# Patient Record
Sex: FEMALE | Race: WHITE | NOT HISPANIC OR LATINO | URBAN - METROPOLITAN AREA
[De-identification: names, ages, dates, MRNs, and addresses within clinical notes are randomized per-mention and may not be internally consistent; named-entity substitution may affect disease eponyms.]

---

## 2017-04-25 ENCOUNTER — APPOINTMENT (OUTPATIENT)
Dept: WOMENS IMAGING | Facility: HOSPITAL | Age: 52
End: 2017-04-25

## 2017-04-25 PROCEDURE — 77063 BREAST TOMOSYNTHESIS BI: CPT | Performed by: RADIOLOGY

## 2017-04-25 PROCEDURE — 77067 SCR MAMMO BI INCL CAD: CPT | Performed by: RADIOLOGY

## 2017-05-23 ENCOUNTER — APPOINTMENT (OUTPATIENT)
Dept: GENERAL RADIOLOGY | Facility: HOSPITAL | Age: 52
End: 2017-05-23

## 2017-05-23 ENCOUNTER — HOSPITAL ENCOUNTER (EMERGENCY)
Facility: HOSPITAL | Age: 52
Discharge: HOME | End: 2017-05-23
Attending: EMERGENCY MEDICINE | Admitting: EMERGENCY MEDICINE

## 2017-05-23 VITALS
OXYGEN SATURATION: 98 % | HEART RATE: 68 BPM | HEIGHT: 63 IN | BODY MASS INDEX: 26.58 KG/M2 | TEMPERATURE: 98.1 F | WEIGHT: 150 LBS | RESPIRATION RATE: 18 BRPM | SYSTOLIC BLOOD PRESSURE: 144 MMHG | DIASTOLIC BLOOD PRESSURE: 85 MMHG

## 2017-05-23 DIAGNOSIS — K59.00 CONSTIPATION, UNSPECIFIED CONSTIPATION TYPE: Primary | ICD-10-CM

## 2017-05-23 DIAGNOSIS — M25.512 CHRONIC PAIN OF BOTH SHOULDERS: ICD-10-CM

## 2017-05-23 DIAGNOSIS — G89.29 CHRONIC PAIN OF BOTH SHOULDERS: ICD-10-CM

## 2017-05-23 DIAGNOSIS — M25.511 CHRONIC PAIN OF BOTH SHOULDERS: ICD-10-CM

## 2017-05-23 LAB
ALBUMIN SERPL-MCNC: 4.1 G/DL (ref 3.5–5)
ALBUMIN/GLOB SERPL: 1.2 G/DL (ref 1.5–2.5)
ALP SERPL-CCNC: 70 U/L (ref 35–104)
ALT SERPL W P-5'-P-CCNC: 16 U/L (ref 10–36)
ANION GAP SERPL CALCULATED.3IONS-SCNC: 3.1 MMOL/L (ref 3.6–11.2)
AST SERPL-CCNC: 22 U/L (ref 10–30)
BACTERIA UR QL AUTO: ABNORMAL /HPF
BASOPHILS # BLD AUTO: 0.05 10*3/MM3 (ref 0–0.3)
BASOPHILS NFR BLD AUTO: 0.9 % (ref 0–2)
BILIRUB SERPL-MCNC: 0.4 MG/DL (ref 0.2–1.8)
BILIRUB UR QL STRIP: NEGATIVE
BUN BLD-MCNC: 10 MG/DL (ref 7–21)
BUN/CREAT SERPL: 14.1 (ref 7–25)
CALCIUM SPEC-SCNC: 9.5 MG/DL (ref 7.7–10)
CHLORIDE SERPL-SCNC: 109 MMOL/L (ref 99–112)
CLARITY UR: CLEAR
CO2 SERPL-SCNC: 27.9 MMOL/L (ref 24.3–31.9)
COLOR UR: YELLOW
CREAT BLD-MCNC: 0.71 MG/DL (ref 0.43–1.29)
DEPRECATED RDW RBC AUTO: 40.7 FL (ref 37–54)
EOSINOPHIL # BLD AUTO: 0.11 10*3/MM3 (ref 0–0.7)
EOSINOPHIL NFR BLD AUTO: 1.9 % (ref 0–5)
ERYTHROCYTE [DISTWIDTH] IN BLOOD BY AUTOMATED COUNT: 12.3 % (ref 11.5–14.5)
GFR SERPL CREATININE-BSD FRML MDRD: 86 ML/MIN/1.73
GLOBULIN UR ELPH-MCNC: 3.3 GM/DL
GLUCOSE BLD-MCNC: 61 MG/DL (ref 70–110)
GLUCOSE UR STRIP-MCNC: NEGATIVE MG/DL
HCG SERPL QL: NEGATIVE
HCT VFR BLD AUTO: 40.4 % (ref 37–47)
HGB BLD-MCNC: 13.5 G/DL (ref 12–16)
HGB UR QL STRIP.AUTO: ABNORMAL
HOLD SPECIMEN: NORMAL
HOLD SPECIMEN: NORMAL
HYALINE CASTS UR QL AUTO: ABNORMAL /LPF
IMM GRANULOCYTES # BLD: 0 10*3/MM3 (ref 0–0.03)
IMM GRANULOCYTES NFR BLD: 0 % (ref 0–0.5)
KETONES UR QL STRIP: NEGATIVE
LEUKOCYTE ESTERASE UR QL STRIP.AUTO: NEGATIVE
LIPASE SERPL-CCNC: 65 U/L (ref 13–60)
LYMPHOCYTES # BLD AUTO: 1.25 10*3/MM3 (ref 1–3)
LYMPHOCYTES NFR BLD AUTO: 21.8 % (ref 21–51)
MCH RBC QN AUTO: 31 PG (ref 27–33)
MCHC RBC AUTO-ENTMCNC: 33.4 G/DL (ref 33–37)
MCV RBC AUTO: 92.7 FL (ref 80–94)
MONOCYTES # BLD AUTO: 0.57 10*3/MM3 (ref 0.1–0.9)
MONOCYTES NFR BLD AUTO: 9.9 % (ref 0–10)
NEUTROPHILS # BLD AUTO: 3.76 10*3/MM3 (ref 1.4–6.5)
NEUTROPHILS NFR BLD AUTO: 65.5 % (ref 30–70)
NITRITE UR QL STRIP: NEGATIVE
OSMOLALITY SERPL CALC.SUM OF ELEC: 276.4 MOSM/KG (ref 273–305)
PH UR STRIP.AUTO: <=5 [PH] (ref 5–8)
PLATELET # BLD AUTO: 225 10*3/MM3 (ref 130–400)
PMV BLD AUTO: 11.6 FL (ref 6–10)
POTASSIUM BLD-SCNC: 4.4 MMOL/L (ref 3.5–5.3)
PROT SERPL-MCNC: 7.4 G/DL (ref 6–8)
PROT UR QL STRIP: NEGATIVE
RBC # BLD AUTO: 4.36 10*6/MM3 (ref 4.2–5.4)
RBC # UR: ABNORMAL /HPF
REF LAB TEST METHOD: ABNORMAL
SODIUM BLD-SCNC: 140 MMOL/L (ref 135–153)
SP GR UR STRIP: 1.01 (ref 1–1.03)
SQUAMOUS #/AREA URNS HPF: ABNORMAL /HPF
TROPONIN I SERPL-MCNC: <0.006 NG/ML
UROBILINOGEN UR QL STRIP: ABNORMAL
WBC NRBC COR # BLD: 5.74 10*3/MM3 (ref 4.5–12.5)
WBC UR QL AUTO: ABNORMAL /HPF
WHOLE BLOOD HOLD SPECIMEN: NORMAL
WHOLE BLOOD HOLD SPECIMEN: NORMAL

## 2017-05-23 PROCEDURE — 80053 COMPREHEN METABOLIC PANEL: CPT | Performed by: EMERGENCY MEDICINE

## 2017-05-23 PROCEDURE — 83690 ASSAY OF LIPASE: CPT | Performed by: EMERGENCY MEDICINE

## 2017-05-23 PROCEDURE — 84484 ASSAY OF TROPONIN QUANT: CPT | Performed by: EMERGENCY MEDICINE

## 2017-05-23 PROCEDURE — 71020 XR CHEST 2 VW: CPT | Performed by: RADIOLOGY

## 2017-05-23 PROCEDURE — 93005 ELECTROCARDIOGRAM TRACING: CPT | Performed by: EMERGENCY MEDICINE

## 2017-05-23 PROCEDURE — 74000 HC ABDOMEN KUB: CPT

## 2017-05-23 PROCEDURE — 87086 URINE CULTURE/COLONY COUNT: CPT | Performed by: EMERGENCY MEDICINE

## 2017-05-23 PROCEDURE — 96372 THER/PROPH/DIAG INJ SC/IM: CPT

## 2017-05-23 PROCEDURE — 99283 EMERGENCY DEPT VISIT LOW MDM: CPT

## 2017-05-23 PROCEDURE — 81001 URINALYSIS AUTO W/SCOPE: CPT | Performed by: EMERGENCY MEDICINE

## 2017-05-23 PROCEDURE — 74000 XR ABDOMEN KUB: CPT | Performed by: RADIOLOGY

## 2017-05-23 PROCEDURE — 25010000002 KETOROLAC TROMETHAMINE PER 15 MG: Performed by: PHYSICIAN ASSISTANT

## 2017-05-23 PROCEDURE — 71020 HC CHEST PA AND LATERAL: CPT

## 2017-05-23 PROCEDURE — 84703 CHORIONIC GONADOTROPIN ASSAY: CPT | Performed by: EMERGENCY MEDICINE

## 2017-05-23 PROCEDURE — 85025 COMPLETE CBC W/AUTO DIFF WBC: CPT | Performed by: EMERGENCY MEDICINE

## 2017-05-23 RX ORDER — ATENOLOL 100 MG/1
100 TABLET ORAL DAILY
COMMUNITY

## 2017-05-23 RX ORDER — SODIUM CHLORIDE 0.9 % (FLUSH) 0.9 %
10 SYRINGE (ML) INJECTION AS NEEDED
Status: DISCONTINUED | OUTPATIENT
Start: 2017-05-23 | End: 2017-05-23 | Stop reason: HOSPADM

## 2017-05-23 RX ORDER — POLYETHYLENE GLYCOL 3350 17 G/17G
17 POWDER, FOR SOLUTION ORAL DAILY
Qty: 850 G | Refills: 0 | Status: SHIPPED | OUTPATIENT
Start: 2017-05-23

## 2017-05-23 RX ORDER — KETOROLAC TROMETHAMINE 30 MG/ML
60 INJECTION, SOLUTION INTRAMUSCULAR; INTRAVENOUS ONCE
Status: COMPLETED | OUTPATIENT
Start: 2017-05-23 | End: 2017-05-23

## 2017-05-23 RX ADMIN — KETOROLAC TROMETHAMINE 60 MG: 30 INJECTION, SOLUTION INTRAMUSCULAR at 16:11

## 2017-05-24 ENCOUNTER — HOSPITAL ENCOUNTER (EMERGENCY)
Facility: HOSPITAL | Age: 52
Discharge: HOME | End: 2017-05-24
Attending: EMERGENCY MEDICINE | Admitting: EMERGENCY MEDICINE

## 2017-05-24 ENCOUNTER — APPOINTMENT (OUTPATIENT)
Dept: CT IMAGING | Facility: HOSPITAL | Age: 52
End: 2017-05-24

## 2017-05-24 VITALS
HEART RATE: 60 BPM | WEIGHT: 150 LBS | DIASTOLIC BLOOD PRESSURE: 98 MMHG | SYSTOLIC BLOOD PRESSURE: 171 MMHG | BODY MASS INDEX: 26.58 KG/M2 | OXYGEN SATURATION: 99 % | HEIGHT: 63 IN | RESPIRATION RATE: 16 BRPM | TEMPERATURE: 98.3 F

## 2017-05-24 DIAGNOSIS — D25.9 UTERINE LEIOMYOMA, UNSPECIFIED LOCATION: Primary | ICD-10-CM

## 2017-05-24 LAB
BACTERIA UR QL AUTO: ABNORMAL /HPF
BILIRUB UR QL STRIP: NEGATIVE
CLARITY UR: CLEAR
COLOR UR: YELLOW
GLUCOSE UR STRIP-MCNC: NEGATIVE MG/DL
HGB UR QL STRIP.AUTO: ABNORMAL
HYALINE CASTS UR QL AUTO: ABNORMAL /LPF
KETONES UR QL STRIP: NEGATIVE
LEUKOCYTE ESTERASE UR QL STRIP.AUTO: NEGATIVE
NITRITE UR QL STRIP: NEGATIVE
PH UR STRIP.AUTO: 6.5 [PH] (ref 5–8)
PROT UR QL STRIP: NEGATIVE
RBC # UR: ABNORMAL /HPF
REF LAB TEST METHOD: ABNORMAL
SP GR UR STRIP: 1.02 (ref 1–1.03)
SQUAMOUS #/AREA URNS HPF: ABNORMAL /HPF
UROBILINOGEN UR QL STRIP: ABNORMAL
WBC UR QL AUTO: ABNORMAL /HPF

## 2017-05-24 PROCEDURE — 74177 CT ABD & PELVIS W/CONTRAST: CPT

## 2017-05-24 PROCEDURE — 0 IOPAMIDOL 61 % SOLUTION: Performed by: EMERGENCY MEDICINE

## 2017-05-24 PROCEDURE — 74177 CT ABD & PELVIS W/CONTRAST: CPT | Performed by: RADIOLOGY

## 2017-05-24 PROCEDURE — 81001 URINALYSIS AUTO W/SCOPE: CPT | Performed by: PHYSICIAN ASSISTANT

## 2017-05-24 PROCEDURE — 99284 EMERGENCY DEPT VISIT MOD MDM: CPT

## 2017-05-24 RX ORDER — SODIUM CHLORIDE 0.9 % (FLUSH) 0.9 %
10 SYRINGE (ML) INJECTION AS NEEDED
Status: DISCONTINUED | OUTPATIENT
Start: 2017-05-24 | End: 2017-05-24 | Stop reason: HOSPADM

## 2017-05-24 RX ADMIN — IOPAMIDOL 100 ML: 612 INJECTION, SOLUTION INTRAVENOUS at 11:36

## 2017-05-26 LAB — BACTERIA SPEC AEROBE CULT: NORMAL

## 2023-08-31 ENCOUNTER — OFFICE VISIT (OUTPATIENT)
Dept: CARDIOLOGY | Facility: CLINIC | Age: 58
End: 2023-08-31
Payer: COMMERCIAL

## 2023-08-31 VITALS
HEART RATE: 65 BPM | WEIGHT: 170 LBS | HEIGHT: 64 IN | DIASTOLIC BLOOD PRESSURE: 92 MMHG | BODY MASS INDEX: 29.02 KG/M2 | SYSTOLIC BLOOD PRESSURE: 144 MMHG

## 2023-08-31 DIAGNOSIS — I10 PRIMARY HYPERTENSION: ICD-10-CM

## 2023-08-31 DIAGNOSIS — R06.02 SHORTNESS OF BREATH: ICD-10-CM

## 2023-08-31 DIAGNOSIS — E88.81 METABOLIC SYNDROME: ICD-10-CM

## 2023-08-31 DIAGNOSIS — R07.89 CHEST TIGHTNESS: Primary | ICD-10-CM

## 2023-08-31 DIAGNOSIS — R53.82 CHRONIC FATIGUE: ICD-10-CM

## 2023-08-31 DIAGNOSIS — R00.2 PALPITATIONS: ICD-10-CM

## 2023-08-31 PROBLEM — E88.810 METABOLIC SYNDROME: Status: ACTIVE | Noted: 2023-08-31

## 2023-08-31 PROCEDURE — 1160F RVW MEDS BY RX/DR IN RCRD: CPT | Performed by: INTERNAL MEDICINE

## 2023-08-31 PROCEDURE — 99204 OFFICE O/P NEW MOD 45 MIN: CPT | Performed by: INTERNAL MEDICINE

## 2023-08-31 PROCEDURE — 3077F SYST BP >= 140 MM HG: CPT | Performed by: INTERNAL MEDICINE

## 2023-08-31 PROCEDURE — 93000 ELECTROCARDIOGRAM COMPLETE: CPT | Performed by: INTERNAL MEDICINE

## 2023-08-31 PROCEDURE — 1159F MED LIST DOCD IN RCRD: CPT | Performed by: INTERNAL MEDICINE

## 2023-08-31 PROCEDURE — 3080F DIAST BP >= 90 MM HG: CPT | Performed by: INTERNAL MEDICINE

## 2023-08-31 RX ORDER — HYDROCHLOROTHIAZIDE 12.5 MG/1
12.5 TABLET ORAL DAILY
COMMUNITY

## 2023-08-31 RX ORDER — ASPIRIN 81 MG/1
81 TABLET ORAL DAILY
COMMUNITY

## 2023-08-31 RX ORDER — LOSARTAN POTASSIUM 50 MG/1
50 TABLET ORAL DAILY
Qty: 30 TABLET | Refills: 6 | Status: SHIPPED | OUTPATIENT
Start: 2023-08-31

## 2023-08-31 RX ORDER — LOSARTAN POTASSIUM 25 MG/1
25 TABLET ORAL DAILY
COMMUNITY
End: 2023-08-31 | Stop reason: DRUGHIGH

## 2023-08-31 RX ORDER — ESCITALOPRAM OXALATE 10 MG/1
10 TABLET ORAL DAILY
COMMUNITY

## 2023-08-31 RX ORDER — ATENOLOL 50 MG/1
50 TABLET ORAL 2 TIMES DAILY
COMMUNITY

## 2023-08-31 NOTE — PROGRESS NOTES
Chief Complaint   Patient presents with    Establish Care     Referred for chest pain. EKG from PCP in referral. Last lab work was done last week per PCP, will attempt to obtain. Did to go to ER on 03/15/23 when her brother  due to chest pain and high BP, records in door. CTA of abdomen and pelvis from 2017 is also in door.     Aspirin     Patient is on aspirin daily.     Chest Pain     States that she has been having a lot of chest tightness. But states that she has been under a lot of stress. She had lost her brother, mom, cousin, and uncle in the past 5 months. States that sometimes left arm goes numb.     Shortness of Breath     States that she does have shortness of breath if she gets nervous, chest will also feel tight when she gets short of breath.     Palpitations     States that occasionally her heart feels like it is fluttering.         CARDIAC COMPLAINTS  chest pressure/discomfort, dyspnea, fatigue, and palpitations      Subjective   Any Meadows is a 58 y.o. female came in today for her initial cardiac evaluation.  She has history of hypertension for which she has been on Tenormin for many years.  She has been under a lot of stress for the last few months.  Her brother was found dead.  It could have been drug overdose versus sudden cardiac arrest since he did have coronary calcification in the past.  She developed severe chest pain and left arm numbness.  She went to the emergency room where her initial blood pressure was more than 200 systolic.  She was given IV fluids and then low-dose of Cozaar after which the blood pressure got better.  MI was ruled out and she was sent home with and advised to follow-up with cardiologist.  She started having chest tightness on and off since then.  In the last few months she also lost mother cousin and uncle.  She has been having increasing chest tightness both on exertion as well as at rest.  Every time she thinks about them, she gets stress and the chest  tightness comes.  She also started noticing increasing shortness of breath during this time.  She also has palpitation in the form of heart skipping and racing.  Her lab work when she was in the emergency room did not show any acute changes.  She has no history of smoking or drinking alcohol.  She has the family history of sudden death.    History reviewed. No pertinent surgical history.    Current Outpatient Medications   Medication Sig Dispense Refill    aspirin 81 MG EC tablet Take 1 tablet by mouth Daily.      atenolol (TENORMIN) 50 MG tablet Take 1 tablet by mouth 2 (Two) Times a Day.      escitalopram (LEXAPRO) 10 MG tablet Take 1 tablet by mouth Daily.      hydroCHLOROthiazide (HYDRODIURIL) 12.5 MG tablet Take 1 tablet by mouth Daily.      losartan (Cozaar) 50 MG tablet Take 1 tablet by mouth Daily. 30 tablet 6     No current facility-administered medications for this visit.           ALLERGIES:  Patient has no known allergies.    Past Medical History:   Diagnosis Date    Anxiety     Depression     Hx of tubal ligation     Hypertension        Social History     Tobacco Use   Smoking Status Never   Smokeless Tobacco Never          Family History   Problem Relation Age of Onset    Hypertension Mother     Cancer Mother     Diabetes Mother     Cancer Father     Heart attack Brother        Review of Systems   Constitutional: Positive for malaise/fatigue. Negative for decreased appetite.   HENT:  Negative for congestion and sore throat.    Eyes:  Negative for blurred vision, double vision and visual disturbance.   Cardiovascular:  Positive for chest pain, dyspnea on exertion and palpitations.   Respiratory:  Positive for shortness of breath. Negative for snoring.    Endocrine: Negative for cold intolerance and heat intolerance.   Hematologic/Lymphatic: Negative for adenopathy. Does not bruise/bleed easily.   Skin:  Negative for itching, nail changes and skin cancer.   Musculoskeletal:  Negative for  "arthritis and myalgias.   Gastrointestinal:  Negative for abdominal pain, dysphagia and heartburn.   Genitourinary:  Negative for bladder incontinence and frequency.   Neurological:  Negative for dizziness, seizures and vertigo.   Psychiatric/Behavioral:  Negative for altered mental status.    Allergic/Immunologic: Negative for environmental allergies and hives.     Diabetes- No  Thyroid- normal    Objective     /92 (BP Location: Left arm)   Pulse 65   Ht 161.3 cm (63.5\")   Wt 77.1 kg (170 lb)   BMI 29.64 kg/mý     Vitals and nursing note reviewed.   Constitutional:       Appearance: Healthy appearance. Not in distress.   Eyes:      Conjunctiva/sclera: Conjunctivae normal.      Pupils: Pupils are equal, round, and reactive to light.   HENT:      Head: Normocephalic.   Pulmonary:      Effort: Pulmonary effort is normal.      Breath sounds: Normal breath sounds.   Cardiovascular:      PMI at left midclavicular line. Normal rate. Regular rhythm.      Murmurs: There is a grade 3/6 high frequency blowing holosystolic murmur at the apex.   Abdominal:      General: Bowel sounds are normal.      Palpations: Abdomen is soft.   Musculoskeletal: Normal range of motion.      Cervical back: Normal range of motion and neck supple. Skin:     General: Skin is warm and dry.   Neurological:      Mental Status: Alert, oriented to person, place, and time and oriented to person, place and time.       ECG 12 Lead    Date/Time: 8/31/2023 2:55 PM  Performed by: Talat Coyne MD  Authorized by: Talat Coyne MD   Comparison: compared with previous ECG from 5/9/2023  Comparison to previous ECG: Low Qrs. ST Changes  Rhythm: sinus rhythm  Rate: normal  QRS axis: normal  Other findings: non-specific ST-T wave changes and low voltage    Clinical impression: non-specific ECG        @ASSESSMENT/PLAN@  BMI is >= 25 and <30. (Overweight) The following options were offered after discussion;: weight loss educational material " (shared in after visit summary), exercise counseling/recommendations, and nutrition counseling/recommendations     Diagnoses and all orders for this visit:    1. Chest tightness (Primary)  -     Stress Test With Myocardial Perfusion One Day; Future  -     High Sensitivity CRP; Future  -     Lipid Panel; Future    2. Primary hypertension  -     losartan (Cozaar) 50 MG tablet; Take 1 tablet by mouth Daily.  Dispense: 30 tablet; Refill: 6  -     Comprehensive Metabolic Panel; Future    3. Shortness of breath  -     Adult Transthoracic Echo Complete W/ Cont if Necessary Per Protocol; Future    4. Palpitations  -     TSH; Future  -     Magnesium; Future    5. Metabolic syndrome  -     CBC & Differential; Future  -     Overnight Sleep Oximetry Study; Future    6. Chronic fatigue  -     Overnight Sleep Oximetry Study; Future    At baseline her heart rate is stable.  Her blood pressure is elevated.  Her EKG shows sinus rhythm, small QRS complex, nonspecific ST-T changes.  Her clinical examination reveals a BMI of 30.  Her cardiovascular examination reveals 3/6 systolic murmur at the mitral area.    Regarding the chest tightness, it is little worrisome for coronary artery disease.  She does have family history of sudden death.  I advised her to undergo a stress test to evaluate her functional status, chronotropic response, blood pressure response and to rule out any stress-induced ischemia or arrhythmia.  I also advised her to check her CRP level as well as lipid panel    Regarding her hypertension, it is still elevated.  I increased her dose of Cozaar to 50 mg once a day.  She needs to have the electrolytes and renal function checked    Regarding her shortness of breath, which could be related to her metabolic syndrome but need to rule out cardiac.  I scheduled her to undergo an echocardiogram to look for LVH, LV function, valvular structures as well as the PA pressure    Regarding the palpitation, at this time continue the  atenolol.  I like to get a TSH level and magnesium level checked.  If the stress test shows any other arrhythmia then that can be treated based on the ischemic burden    Regarding the metabolic syndrome and chronic fatigue, advised her to check her blood count.  I also talked to her about possibility of sleep apnea.  I advised her to check her nocturnal pulse PO2 measurement    Based on the results, further recommendations will be made               Electronically signed by Talat Coyne MD August 31, 2023 14:45 EDT

## 2023-08-31 NOTE — LETTER
2023       No Recipients    Patient: Any Meadows   YOB: 1965   Date of Visit: 2023     Dear ARCHIE Phipps:       Thank you for referring Any Meadows to me for evaluation. Below are the relevant portions of my assessment and plan of care.    If you have questions, please do not hesitate to call me. I look forward to following Any along with you.         Sincerely,        Talat Coyne MD        CC:   No Recipients    Talat Coyne MD  [id number]  Sign when Signing Visit  Chief Complaint   Patient presents with    Establish Care     Referred for chest pain. EKG from PCP in referral. Last lab work was done last week per PCP, will attempt to obtain. Did to go to ER on 03/15/23 when her brother  due to chest pain and high BP, records in door. CTA of abdomen and pelvis from 2017 is also in door.     Aspirin     Patient is on aspirin daily.     Chest Pain     States that she has been having a lot of chest tightness. But states that she has been under a lot of stress. She had lost her brother, mom, cousin, and uncle in the past 5 months. States that sometimes left arm goes numb.     Shortness of Breath     States that she does have shortness of breath if she gets nervous, chest will also feel tight when she gets short of breath.     Palpitations     States that occasionally her heart feels like it is fluttering.         CARDIAC COMPLAINTS  chest pressure/discomfort, dyspnea, fatigue, and palpitations      Subjective  Any Meadows is a 58 y.o. female came in today for her initial cardiac evaluation.  She has history of hypertension for which she has been on Tenormin for many years.  She has been under a lot of stress for the last few months.  Her brother was found dead.  It could have been drug overdose versus sudden cardiac arrest since he did have coronary calcification in the past.  She developed severe chest pain and left arm numbness.   She went to the emergency room where her initial blood pressure was more than 200 systolic.  She was given IV fluids and then low-dose of Cozaar after which the blood pressure got better.  MI was ruled out and she was sent home with and advised to follow-up with cardiologist.  She started having chest tightness on and off since then.  In the last few months she also lost mother cousin and uncle.  She has been having increasing chest tightness both on exertion as well as at rest.  Every time she thinks about them, she gets stress and the chest tightness comes.  She also started noticing increasing shortness of breath during this time.  She also has palpitation in the form of heart skipping and racing.  Her lab work when she was in the emergency room did not show any acute changes.  She has no history of smoking or drinking alcohol.  She has the family history of sudden death.    History reviewed. No pertinent surgical history.    Current Outpatient Medications   Medication Sig Dispense Refill    aspirin 81 MG EC tablet Take 1 tablet by mouth Daily.      atenolol (TENORMIN) 50 MG tablet Take 1 tablet by mouth 2 (Two) Times a Day.      escitalopram (LEXAPRO) 10 MG tablet Take 1 tablet by mouth Daily.      hydroCHLOROthiazide (HYDRODIURIL) 12.5 MG tablet Take 1 tablet by mouth Daily.      losartan (Cozaar) 50 MG tablet Take 1 tablet by mouth Daily. 30 tablet 6     No current facility-administered medications for this visit.           ALLERGIES:  Patient has no known allergies.    Past Medical History:   Diagnosis Date    Anxiety     Depression     Hx of tubal ligation     Hypertension        Social History     Tobacco Use   Smoking Status Never   Smokeless Tobacco Never          Family History   Problem Relation Age of Onset    Hypertension Mother     Cancer Mother     Diabetes Mother     Cancer Father     Heart attack Brother        Review of Systems   Constitutional: Positive for malaise/fatigue.  "Negative for decreased appetite.   HENT:  Negative for congestion and sore throat.    Eyes:  Negative for blurred vision, double vision and visual disturbance.   Cardiovascular:  Positive for chest pain, dyspnea on exertion and palpitations.   Respiratory:  Positive for shortness of breath. Negative for snoring.    Endocrine: Negative for cold intolerance and heat intolerance.   Hematologic/Lymphatic: Negative for adenopathy. Does not bruise/bleed easily.   Skin:  Negative for itching, nail changes and skin cancer.   Musculoskeletal:  Negative for arthritis and myalgias.   Gastrointestinal:  Negative for abdominal pain, dysphagia and heartburn.   Genitourinary:  Negative for bladder incontinence and frequency.   Neurological:  Negative for dizziness, seizures and vertigo.   Psychiatric/Behavioral:  Negative for altered mental status.    Allergic/Immunologic: Negative for environmental allergies and hives.     Diabetes- No  Thyroid- normal    Objective    /92 (BP Location: Left arm)   Pulse 65   Ht 161.3 cm (63.5\")   Wt 77.1 kg (170 lb)   BMI 29.64 kg/mý     Vitals and nursing note reviewed.   Constitutional:       Appearance: Healthy appearance. Not in distress.   Eyes:      Conjunctiva/sclera: Conjunctivae normal.      Pupils: Pupils are equal, round, and reactive to light.   HENT:      Head: Normocephalic.   Pulmonary:      Effort: Pulmonary effort is normal.      Breath sounds: Normal breath sounds.   Cardiovascular:      PMI at left midclavicular line. Normal rate. Regular rhythm.      Murmurs: There is a grade 3/6 high frequency blowing holosystolic murmur at the apex.   Abdominal:      General: Bowel sounds are normal.      Palpations: Abdomen is soft.   Musculoskeletal: Normal range of motion.      Cervical back: Normal range of motion and neck supple. Skin:     General: Skin is warm and dry.   Neurological:      Mental Status: Alert, oriented to person, place, and time and oriented to person, place " and time.       ECG 12 Lead    Date/Time: 8/31/2023 2:55 PM  Performed by: Talat Coyne MD  Authorized by: Talat Coyne MD   Comparison: compared with previous ECG from 5/9/2023  Comparison to previous ECG: Low Qrs. ST Changes  Rhythm: sinus rhythm  Rate: normal  QRS axis: normal  Other findings: non-specific ST-T wave changes and low voltage    Clinical impression: non-specific ECG        @ASSESSMENT/PLAN@  BMI is >= 25 and <30. (Overweight) The following options were offered after discussion;: weight loss educational material (shared in after visit summary), exercise counseling/recommendations, and nutrition counseling/recommendations     Diagnoses and all orders for this visit:    1. Chest tightness (Primary)  -     Stress Test With Myocardial Perfusion One Day; Future  -     High Sensitivity CRP; Future  -     Lipid Panel; Future    2. Primary hypertension  -     losartan (Cozaar) 50 MG tablet; Take 1 tablet by mouth Daily.  Dispense: 30 tablet; Refill: 6  -     Comprehensive Metabolic Panel; Future    3. Shortness of breath  -     Adult Transthoracic Echo Complete W/ Cont if Necessary Per Protocol; Future    4. Palpitations  -     TSH; Future  -     Magnesium; Future    5. Metabolic syndrome  -     CBC & Differential; Future  -     Overnight Sleep Oximetry Study; Future    6. Chronic fatigue  -     Overnight Sleep Oximetry Study; Future    At baseline her heart rate is stable.  Her blood pressure is elevated.  Her EKG shows sinus rhythm, small QRS complex, nonspecific ST-T changes.  Her clinical examination reveals a BMI of 30.  Her cardiovascular examination reveals 3/6 systolic murmur at the mitral area.    Regarding the chest tightness, it is little worrisome for coronary artery disease.  She does have family history of sudden death.  I advised her to undergo a stress test to evaluate her functional status, chronotropic response, blood pressure response and to rule out any stress-induced  ischemia or arrhythmia.  I also advised her to check her CRP level as well as lipid panel    Regarding her hypertension, it is still elevated.  I increased her dose of Cozaar to 50 mg once a day.  She needs to have the electrolytes and renal function checked    Regarding her shortness of breath, which could be related to her metabolic syndrome but need to rule out cardiac.  I scheduled her to undergo an echocardiogram to look for LVH, LV function, valvular structures as well as the PA pressure    Regarding the palpitation, at this time continue the atenolol.  I like to get a TSH level and magnesium level checked.  If the stress test shows any other arrhythmia then that can be treated based on the ischemic burden    Regarding the metabolic syndrome and chronic fatigue, advised her to check her blood count.  I also talked to her about possibility of sleep apnea.  I advised her to check her nocturnal pulse PO2 measurement    Based on the results, further recommendations will be made               Electronically signed by Talat Coyne MD August 31, 2023 14:45 EDT

## 2023-09-12 DIAGNOSIS — E88.81 METABOLIC SYNDROME: ICD-10-CM

## 2023-09-12 DIAGNOSIS — R53.82 CHRONIC FATIGUE: ICD-10-CM

## 2023-09-21 ENCOUNTER — HOSPITAL ENCOUNTER (OUTPATIENT)
Dept: CARDIOLOGY | Facility: HOSPITAL | Age: 58
Discharge: HOME | End: 2023-09-21
Payer: COMMERCIAL

## 2023-09-21 DIAGNOSIS — R07.89 CHEST TIGHTNESS: ICD-10-CM

## 2023-09-21 DIAGNOSIS — R06.02 SHORTNESS OF BREATH: ICD-10-CM

## 2023-09-21 LAB
BH CV ECHO MEAS - ACS: 1.99 CM
BH CV ECHO MEAS - AO MAX PG: 4.2 MMHG
BH CV ECHO MEAS - AO MEAN PG: 2 MMHG
BH CV ECHO MEAS - AO ROOT DIAM: 3.4 CM
BH CV ECHO MEAS - AO V2 MAX: 102 CM/SEC
BH CV ECHO MEAS - AO V2 VTI: 23.4 CM
BH CV ECHO MEAS - EDV(CUBED): 49.4 ML
BH CV ECHO MEAS - EDV(MOD-SP4): 52.5 ML
BH CV ECHO MEAS - EF(MOD-SP4): 64.6 %
BH CV ECHO MEAS - EF_3D-VOL: 64 %
BH CV ECHO MEAS - ESV(CUBED): 10.9 ML
BH CV ECHO MEAS - ESV(MOD-SP4): 18.6 ML
BH CV ECHO MEAS - FS: 39.5 %
BH CV ECHO MEAS - IVS/LVPW: 1.26 CM
BH CV ECHO MEAS - IVSD: 1.35 CM
BH CV ECHO MEAS - LA DIMENSION: 3.2 CM
BH CV ECHO MEAS - LAT PEAK E' VEL: 9.5 CM/SEC
BH CV ECHO MEAS - LV DIASTOLIC VOL/BSA (35-75): 29.1 CM2
BH CV ECHO MEAS - LV MASS(C)D: 147.4 GRAMS
BH CV ECHO MEAS - LV SYSTOLIC VOL/BSA (12-30): 10.3 CM2
BH CV ECHO MEAS - LVIDD: 3.7 CM
BH CV ECHO MEAS - LVIDS: 2.22 CM
BH CV ECHO MEAS - LVPWD: 1.07 CM
BH CV ECHO MEAS - MED PEAK E' VEL: 6.3 CM/SEC
BH CV ECHO MEAS - MV A MAX VEL: 74.6 CM/SEC
BH CV ECHO MEAS - MV DEC TIME: 0.24 SEC
BH CV ECHO MEAS - MV E MAX VEL: 81.4 CM/SEC
BH CV ECHO MEAS - MV E/A: 1.09
BH CV ECHO MEAS - SI(MOD-SP4): 18.8 ML/M2
BH CV ECHO MEAS - SV(MOD-SP4): 33.9 ML
BH CV ECHO MEASUREMENTS AVERAGE E/E' RATIO: 10.3
BH CV REST NUCLEAR ISOTOPE DOSE: 10 MCI
BH CV STRESS COMMENTS STAGE 1: NORMAL
BH CV STRESS DOSE REGADENOSON STAGE 1: 0.4
BH CV STRESS DURATION MIN STAGE 1: 0
BH CV STRESS DURATION SEC STAGE 1: 10
BH CV STRESS NUCLEAR ISOTOPE DOSE: 30 MCI
BH CV STRESS PROTOCOL 1: NORMAL
BH CV STRESS RECOVERY BP: NORMAL MMHG
BH CV STRESS RECOVERY HR: 83 BPM
BH CV STRESS STAGE 1: 1
BH CV XLRA - RV BASE: 2.47 CM
BH CV XLRA - RV LENGTH: 7.6 CM
BH CV XLRA - RV MID: 2.19 CM
LEFT ATRIUM VOLUME INDEX: 16.3 ML/M2
LV EF NUC BP: 67 %
MAXIMAL PREDICTED HEART RATE: 161 BPM
PERCENT MAX PREDICTED HR: 58.39 %
STRESS BASELINE BP: NORMAL MMHG
STRESS BASELINE HR: 60 BPM
STRESS PERCENT HR: 69 %
STRESS POST PEAK BP: NORMAL MMHG
STRESS POST PEAK HR: 94 BPM
STRESS TARGET HR: 137 BPM

## 2023-09-21 PROCEDURE — 0 TECHNETIUM SESTAMIBI: Performed by: INTERNAL MEDICINE

## 2023-09-21 PROCEDURE — 78452 HT MUSCLE IMAGE SPECT MULT: CPT

## 2023-09-21 PROCEDURE — 93017 CV STRESS TEST TRACING ONLY: CPT

## 2023-09-21 PROCEDURE — 93306 TTE W/DOPPLER COMPLETE: CPT

## 2023-09-21 PROCEDURE — A9500 TC99M SESTAMIBI: HCPCS | Performed by: INTERNAL MEDICINE

## 2023-09-21 PROCEDURE — 25010000002 REGADENOSON 0.4 MG/5ML SOLUTION: Performed by: INTERNAL MEDICINE

## 2023-09-21 RX ORDER — REGADENOSON 0.08 MG/ML
0.4 INJECTION, SOLUTION INTRAVENOUS
Status: COMPLETED | OUTPATIENT
Start: 2023-09-21 | End: 2023-09-21

## 2023-09-21 RX ADMIN — REGADENOSON 0.4 MG: 0.08 INJECTION, SOLUTION INTRAVENOUS at 11:23

## 2023-09-21 RX ADMIN — TECHNETIUM TC 99M SESTAMIBI 1 DOSE: 1 INJECTION INTRAVENOUS at 09:20

## 2023-09-21 RX ADMIN — TECHNETIUM TC 99M SESTAMIBI 1 DOSE: 1 INJECTION INTRAVENOUS at 11:23

## 2023-09-22 ENCOUNTER — TELEPHONE (OUTPATIENT)
Dept: CARDIOLOGY | Facility: CLINIC | Age: 58
End: 2023-09-22
Payer: COMMERCIAL

## 2023-09-22 RX ORDER — LOSARTAN POTASSIUM 100 MG/1
100 TABLET ORAL DAILY
Qty: 90 TABLET | Refills: 3 | Status: SHIPPED | OUTPATIENT
Start: 2023-09-22

## 2023-09-22 NOTE — TELEPHONE ENCOUNTER
Pt aware of results and recommendations to increase Cozaar to 100 mg once a day. She is going to take 2 of the 50 mg tabs once a day until she runs out then  new script. Advised to monitor BP and call with any problems.

## 2023-11-01 ENCOUNTER — TELEPHONE (OUTPATIENT)
Dept: CARDIOLOGY | Facility: CLINIC | Age: 58
End: 2023-11-01
Payer: COMMERCIAL

## 2023-11-01 NOTE — TELEPHONE ENCOUNTER
Attempted to contact patient to inquire about overdue labs that have not been completed/obtained. No answer and no voicemail box set up.

## 2023-11-01 NOTE — TELEPHONE ENCOUNTER
Patient called back and I relayed to ask if they had labs done. Patient denies having them completed but were willing to get them done before their next appt date.

## 2024-11-04 ENCOUNTER — TELEPHONE (OUTPATIENT)
Dept: CARDIOLOGY | Facility: CLINIC | Age: 59
End: 2024-11-04
Payer: COMMERCIAL

## 2024-11-04 ENCOUNTER — OFFICE VISIT (OUTPATIENT)
Dept: GASTROENTEROLOGY | Facility: CLINIC | Age: 59
End: 2024-11-04
Payer: COMMERCIAL

## 2024-11-04 ENCOUNTER — TELEPHONE (OUTPATIENT)
Dept: GASTROENTEROLOGY | Facility: CLINIC | Age: 59
End: 2024-11-04

## 2024-11-04 VITALS
HEIGHT: 64 IN | BODY MASS INDEX: 29.53 KG/M2 | SYSTOLIC BLOOD PRESSURE: 156 MMHG | WEIGHT: 173 LBS | DIASTOLIC BLOOD PRESSURE: 91 MMHG | HEART RATE: 67 BPM

## 2024-11-04 DIAGNOSIS — Z86.0100 PERSONAL HISTORY OF COLON POLYPS, UNSPECIFIED: Primary | ICD-10-CM

## 2024-11-04 DIAGNOSIS — K21.9 GASTROESOPHAGEAL REFLUX DISEASE, UNSPECIFIED WHETHER ESOPHAGITIS PRESENT: ICD-10-CM

## 2024-11-04 DIAGNOSIS — K62.5 BRBPR (BRIGHT RED BLOOD PER RECTUM): ICD-10-CM

## 2024-11-04 DIAGNOSIS — K58.1 IRRITABLE BOWEL SYNDROME WITH CONSTIPATION: ICD-10-CM

## 2024-11-04 DIAGNOSIS — K76.89 LIVER CYST: ICD-10-CM

## 2024-11-04 PROCEDURE — 80053 COMPREHEN METABOLIC PANEL: CPT

## 2024-11-04 RX ORDER — PLECANATIDE 3 MG/1
3 TABLET ORAL DAILY
Qty: 30 TABLET | Refills: 11 | Status: SHIPPED | OUTPATIENT
Start: 2024-11-04 | End: 2024-11-04

## 2024-11-04 RX ORDER — BISACODYL 5 MG/1
20 TABLET, DELAYED RELEASE ORAL ONCE
Qty: 4 TABLET | Refills: 0 | Status: SHIPPED | OUTPATIENT
Start: 2024-11-04 | End: 2024-11-04

## 2024-11-04 RX ORDER — POLYETHYLENE GLYCOL 3350 17 G/17G
POWDER, FOR SOLUTION ORAL
Qty: 510 G | Refills: 0 | Status: SHIPPED | OUTPATIENT
Start: 2024-11-04

## 2024-11-04 RX ORDER — PANTOPRAZOLE SODIUM 40 MG/1
40 TABLET, DELAYED RELEASE ORAL DAILY
Qty: 60 TABLET | Refills: 2 | Status: SHIPPED | OUTPATIENT
Start: 2024-11-04

## 2024-11-04 RX ORDER — PLECANATIDE 3 MG/1
3 TABLET ORAL DAILY
Qty: 30 TABLET | Refills: 4 | Status: SHIPPED | OUTPATIENT
Start: 2024-11-04

## 2024-11-04 NOTE — PROGRESS NOTES
"Date of Consultation:  2024  Referring Physician: ARCHIE Harvey    Chief Complaint  liver cyst    Any Meadows is a 59 y.o. female who presents today to [facility] for liver cyst.    HPI:   59-year-old female who reports today for evaluation of liver cyst.  Of note, patient is poor historian.  She states that she has had imaging recently but is unclear if this was an ultrasound, CT or MRI.  We did contact patient's PCP office while she was in house.  Per PCP office, patient has not had any abdominal imaging since .  CT abdomen pelvis performed in May 2017 was notable for small benign cyst in the liver, the largest of the left lobe measuring 6.5 centimeters.  Patient states that she has lower abdominal pain that \"feels like a pressure.\"  She states this pain is worse with eating spicy foods and is better after bowel movements.  She reports having 2 bowel movements per week that she rates as BSS 1.  She does have to strain occasionally and endorses incomplete evacuation of her colon.  She reports 1 episode of bright red bleeding per rectum last week with straining.  Patient has not tried over-the-counter stool softeners.  She reports having prior colonoscopy >15 years ago performed in [facility].  This colonoscopy was notable for several polyps.  She also notes that her mother was diagnosed with colon cancer.  It is unclear if this is the primary etiology of malignancy or if this was metastasis.  However, her mother  shortly after diagnosis.  She notes having frequent acid reflux flares.  Her diet consist of fatty, greasy, and quite a bit of spicy food.  She drinks a sixpack of 12 ounce cans of Coca-Cola every day.  She denies unintentional weight loss, nausea, vomiting, dysphagia, excessive Tylenol use, or alcohol use.             Previous History:   Past Medical History:   Diagnosis Date    Anxiety     Depression     Hx of tubal ligation  " "   Hypertension       Past Surgical History:   Procedure Laterality Date    CARDIOVASCULAR STRESS TEST  09/21/2023    Lexiscan- EF 67%. 171/100. Negative    ECHO - CONVERTED  09/21/2023    TLS. EF 65%. Trace-Mild MR    OTHER SURGICAL HISTORY  09/12/2023    Pulse O2- Borderline      Social History     Socioeconomic History    Marital status:    Tobacco Use    Smoking status: Never    Smokeless tobacco: Never   Vaping Use    Vaping status: Never Used   Substance and Sexual Activity    Alcohol use: No    Drug use: Never    Sexual activity: Defer        Current Medications:  Current Outpatient Medications   Medication Sig Dispense Refill    atenolol (TENORMIN) 50 MG tablet Take 1 tablet by mouth 2 (Two) Times a Day.      escitalopram (LEXAPRO) 10 MG tablet Take 1 tablet by mouth Daily.      hydroCHLOROthiazide (HYDRODIURIL) 12.5 MG tablet Take 1 tablet by mouth Daily.      Plecanatide (Trulance) 3 MG tablet Take 1 tablet by mouth Daily. 30 tablet 4    bisacodyl (DULCOLAX) 5 MG EC tablet Take 4 tablets by mouth 1 (One) Time for 1 dose. Take 4 tablets at 4:00 PM the day before procedure 4 tablet 0    pantoprazole (Protonix) 40 MG EC tablet Take 1 tablet by mouth Daily. 60 tablet 2    polyethylene glycol (MIRALAX) 17 GM/SCOOP powder Take 255 g Miralax mixed with 32 oz clear liquid at 8pm night before procedure then repeat 6 hours prior to arrival to hospital. 510 g 0     No current facility-administered medications for this visit.       Allergies:   No Known Allergies    Vitals:   /91   Pulse 67   Ht 161.3 cm (63.5\")   Wt 78.5 kg (173 lb)   BMI 30.16 kg/m²   Estimated body mass index is 30.16 kg/m² as calculated from the following:    Height as of this encounter: 161.3 cm (63.5\").    Weight as of this encounter: 78.5 kg (173 lb).    Any Meadows  reports that she has never smoked. She has never used smokeless tobacco. I have educated her on the risk of diseases from using tobacco products such as cancer, " COPD, and heart disease.         ROS:   Review of Systems   Constitutional: Negative.    HENT: Negative.     Respiratory: Negative.     Cardiovascular: Negative.    Gastrointestinal:  Positive for abdominal pain, anal bleeding and constipation. Negative for abdominal distention, blood in stool, diarrhea, nausea, rectal pain and vomiting.   All other systems reviewed and are negative.       Physical Exam:   Physical Exam  Vitals reviewed.   Constitutional:       General: She is not in acute distress.     Appearance: Normal appearance. She is well-groomed. She is obese. She is not toxic-appearing.   HENT:      Head: Normocephalic and atraumatic.      Mouth/Throat:      Mouth: Mucous membranes are moist.   Eyes:      Extraocular Movements: Extraocular movements intact.   Cardiovascular:      Rate and Rhythm: Normal rate and regular rhythm.      Heart sounds: Normal heart sounds. No murmur heard.  Pulmonary:      Effort: Pulmonary effort is normal. No respiratory distress.      Breath sounds: Normal breath sounds. No stridor. No wheezing or rales.   Abdominal:      General: Abdomen is flat. Bowel sounds are normal. There is no distension.      Palpations: Abdomen is soft. There is no mass.      Tenderness: There is no abdominal tenderness. There is no guarding or rebound.      Hernia: No hernia is present.   Skin:     General: Skin is warm.      Coloration: Skin is not jaundiced.      Findings: No rash.   Neurological:      Mental Status: She is alert and oriented to person, place, and time.   Psychiatric:         Mood and Affect: Mood and affect normal.         Speech: Speech normal.         Behavior: Behavior normal. Behavior is cooperative.         Thought Content: Thought content normal.          Lab Results:   Lab Results   Component Value Date    WBC 5.74 05/23/2017    HGB 13.5 05/23/2017    HCT 40.4 05/23/2017    MCV 92.7 05/23/2017    RDW 12.3 05/23/2017     05/23/2017    NEUTRORELPCT 65.5 05/23/2017     LYMPHORELPCT 21.8 05/23/2017    MONORELPCT 9.9 05/23/2017    EOSRELPCT 1.9 05/23/2017    BASORELPCT 0.9 05/23/2017    NEUTROABS 3.76 05/23/2017    LYMPHSABS 1.25 05/23/2017       Lab Results   Component Value Date     05/23/2017    K 4.4 05/23/2017    CO2 27.9 05/23/2017     05/23/2017    BUN 10 05/23/2017    CREATININE 0.71 05/23/2017    EGFRIFNONA 86 05/23/2017    GLUCOSE 61 (L) 05/23/2017    CALCIUM 9.5 05/23/2017    ALKPHOS 70 05/23/2017    AST 22 05/23/2017    ALT 16 05/23/2017    BILITOT 0.4 05/23/2017    ALBUMIN 4.10 05/23/2017    PROTEINTOT 7.4 05/23/2017       Pathology:        Endoscopy:        Imaging:   No Images in the past 120 days found..        Results review: During today's encounter, all relevant clinical data has been reviewed.      Assessment and Plan  1. Personal history of colon polyps, unspecified (Primary)  2. BRBPR (bright red blood per rectum)  We will proceed with colonoscopy for further evaluation management above.  Patient was educated on risk and benefits of procedure.  She verbalized understanding and was amenable to proceeding as above.  -     Case Request; Standing  -     Case Request  -     polyethylene glycol (MIRALAX) 17 GM/SCOOP powder; Take 255 g Miralax mixed with 32 oz clear liquid at 8pm night before procedure then repeat 6 hours prior to arrival to hospital.  Dispense: 510 g; Refill: 0  -     bisacodyl (DULCOLAX) 5 MG EC tablet; Take 4 tablets by mouth 1 (One) Time for 1 dose. Take 4 tablets at 4:00 PM the day before procedure  Dispense: 4 tablet; Refill: 0      3. Liver cyst  Has not had imaging in 7 years.  Will obtain liver ultrasound.  Will obtain a CMP as well.  -     US Liver; Future  -     Comprehensive Metabolic Panel; Future  -     Comprehensive Metabolic Panel    4. Gastroesophageal reflux disease, unspecified whether esophagitis present  Discussed management for GERD: encouraged weight loss, HOB elevation at night, avoidance of meals 2-3 hours before  bedtime, avoid trigger foods (caffeine, alcohol, chocolate, acidic/spicy foods e.g oranges/tomatoes), and encouraged smoking cessation  Will initiate Protonix 40 mg once daily.  -     pantoprazole (Protonix) 40 MG EC tablet; Take 1 tablet by mouth Daily.  Dispense: 60 tablet; Refill: 2    5. Irritable bowel syndrome with constipation  Patient reports trialing Linzess in the past without any relief.  Will initiate Trulance 1 tablet daily.  Samples provided in office.  -     Plecanatide (Trulance) 3 MG tablet; Take 1 tablet by mouth Daily.  Dispense: 30 tablet; Refill: 4    New Medications:   New Medications Ordered This Visit   Medications    pantoprazole (Protonix) 40 MG EC tablet     Sig: Take 1 tablet by mouth Daily.     Dispense:  60 tablet     Refill:  2    Plecanatide (Trulance) 3 MG tablet     Sig: Take 1 tablet by mouth Daily.     Dispense:  30 tablet     Refill:  4    polyethylene glycol (MIRALAX) 17 GM/SCOOP powder     Sig: Take 255 g Miralax mixed with 32 oz clear liquid at 8pm night before procedure then repeat 6 hours prior to arrival to hospital.     Dispense:  510 g     Refill:  0    bisacodyl (DULCOLAX) 5 MG EC tablet     Sig: Take 4 tablets by mouth 1 (One) Time for 1 dose. Take 4 tablets at 4:00 PM the day before procedure     Dispense:  4 tablet     Refill:  0       Discontinued Medications:   Medications Discontinued During This Encounter   Medication Reason    aspirin 81 MG EC tablet *Therapy completed    losartan (Cozaar) 100 MG tablet *Therapy completed    Plecanatide (Trulance) 3 MG tablet         Visit Diagnoses:    ICD-10-CM ICD-9-CM   1. Personal history of colon polyps, unspecified  Z86.0100 V12.72   2. BRBPR (bright red blood per rectum)  K62.5 569.3   3. Liver cyst  K76.89 573.8   4. Gastroesophageal reflux disease, unspecified whether esophagitis present  K21.9 530.81   5. Irritable bowel syndrome with constipation  K58.1 564.1            Follow Up:   No follow-ups on file.    The  patient was in agreement with the plan and all questions were answered to patient's satisfaction.        This document has been electronically signed by Alison Monsalve PA-C   November 4, 2024 15:47 EST    Dictated Utilizing Dragon Dictation: Part of this note may be an electronic transcription/translation of spoken language to printed text using the Dragon Dictation System.    CC:  ARCHIE Harvey Tammie L, APRN

## 2024-11-04 NOTE — TELEPHONE ENCOUNTER
PLEASE LET PROVIDER KNOW THAT IMAGING FROM [facility] HAS BEEN INDEXED INTO CHART AND THAT NO OTHER IMAGING WAS AVAILABLE SINCE 2016

## 2024-11-04 NOTE — TELEPHONE ENCOUNTER
----- Message from Ann-Marie RUIZ sent at 11/4/2024  3:05 PM EST -----    ----- Message -----  From: Jaelyn Cannon  Sent: 11/4/2024   2:39 PM EST      Patient is scheduled to have a Colonoscopy on 12-5-24 with Dr. Mendoza and we need to obtain a cardiac clearance please.  
PATIENT SCHEDULED AND AWARE OF APPT  
Patient last seen 08/31/2023.  Please schedule patient a follow up visit.  It has been > 1 year since seen.  
Unknown if ever smoked

## 2024-11-05 ENCOUNTER — TELEPHONE (OUTPATIENT)
Dept: GASTROENTEROLOGY | Facility: CLINIC | Age: 59
End: 2024-11-05
Payer: COMMERCIAL

## 2024-11-05 LAB
ALBUMIN SERPL-MCNC: 4.3 G/DL (ref 3.5–5.2)
ALBUMIN/GLOB SERPL: 1.6 G/DL
ALP SERPL-CCNC: 86 U/L (ref 39–117)
ALT SERPL W P-5'-P-CCNC: 26 U/L (ref 1–33)
ANION GAP SERPL CALCULATED.3IONS-SCNC: 8 MMOL/L (ref 5–15)
AST SERPL-CCNC: 22 U/L (ref 1–32)
BILIRUB SERPL-MCNC: 0.3 MG/DL (ref 0–1.2)
BUN SERPL-MCNC: 9 MG/DL (ref 8–23)
BUN/CREAT SERPL: 10.5 (ref 7–25)
CALCIUM SPEC-SCNC: 9.5 MG/DL (ref 8.6–10.5)
CHLORIDE SERPL-SCNC: 103 MMOL/L (ref 98–107)
CO2 SERPL-SCNC: 25 MMOL/L (ref 22–29)
CREAT SERPL-MCNC: 0.86 MG/DL (ref 0.57–1)
EGFRCR SERPLBLD CKD-EPI 2021: 77.5 ML/MIN/1.73
GLOBULIN UR ELPH-MCNC: 2.7 GM/DL
GLUCOSE SERPL-MCNC: 100 MG/DL (ref 65–99)
POTASSIUM SERPL-SCNC: 4.7 MMOL/L (ref 3.5–5.2)
PROT SERPL-MCNC: 7 G/DL (ref 6–8.5)
SODIUM SERPL-SCNC: 136 MMOL/L (ref 136–145)

## 2024-11-05 NOTE — TELEPHONE ENCOUNTER
----- Message from Alison Monsalve sent at 11/5/2024  8:09 AM EST -----  Please let patient know that CMP drawn yesterday was within normal limits with normal liver enzymes.

## 2024-11-18 ENCOUNTER — HOSPITAL ENCOUNTER (OUTPATIENT)
Dept: ULTRASOUND IMAGING | Facility: HOSPITAL | Age: 59
Discharge: HOME | End: 2024-11-18
Payer: COMMERCIAL

## 2024-11-18 DIAGNOSIS — K76.89 LIVER CYST: ICD-10-CM

## 2024-11-18 PROCEDURE — 76705 ECHO EXAM OF ABDOMEN: CPT

## 2024-11-18 PROCEDURE — 76705 ECHO EXAM OF ABDOMEN: CPT | Performed by: RADIOLOGY

## 2024-11-19 ENCOUNTER — TELEPHONE (OUTPATIENT)
Dept: GASTROENTEROLOGY | Facility: CLINIC | Age: 59
End: 2024-11-19
Payer: COMMERCIAL

## 2024-11-20 ENCOUNTER — TELEPHONE (OUTPATIENT)
Dept: CARDIOLOGY | Facility: CLINIC | Age: 59
End: 2024-11-20

## 2024-11-20 NOTE — TELEPHONE ENCOUNTER
Caller: Any Meadows    Relationship to patient: Self    Best call back number: [phone]     Chief complaint: PT IS NEEDING C.C., WAS RUNNING LATE TO APPT AND UNABLE TO WT CALL TO OFFICE TO SEE IF PT COULD STILL COME IN     Type of visit: FU     Requested date: AROUND 10:30-11:00      If rescheduling, when is the original appointment: 11/20/24       [place] IS TO FAR AWAY FOR C.C

## 2024-11-21 ENCOUNTER — TELEPHONE (OUTPATIENT)
Dept: GASTROENTEROLOGY | Facility: CLINIC | Age: 59
End: 2024-11-21
Payer: COMMERCIAL

## 2024-11-21 DIAGNOSIS — K76.89 HEPATIC CYST: Primary | ICD-10-CM

## 2024-11-21 NOTE — TELEPHONE ENCOUNTER
Called patient to discuss results of liver ultrasound.  Patient has 7.2 cm simple hepatic cyst.  However given that cyst is large, did discuss with Dr. Mendoza.  At this point, we will proceed with MRI with liver mass protocol for further evaluation of the lesion.  Patient verbalized understanding and was amenable to proceeding as above.

## 2024-11-27 ENCOUNTER — OFFICE VISIT (OUTPATIENT)
Dept: CARDIOLOGY | Facility: CLINIC | Age: 59
End: 2024-11-27
Payer: COMMERCIAL

## 2024-11-27 VITALS
SYSTOLIC BLOOD PRESSURE: 120 MMHG | WEIGHT: 175.2 LBS | DIASTOLIC BLOOD PRESSURE: 84 MMHG | BODY MASS INDEX: 29.91 KG/M2 | HEIGHT: 64 IN | HEART RATE: 60 BPM

## 2024-11-27 DIAGNOSIS — I10 ESSENTIAL HYPERTENSION: Primary | ICD-10-CM

## 2024-11-27 DIAGNOSIS — R07.89 CHEST TIGHTNESS: ICD-10-CM

## 2024-11-27 DIAGNOSIS — R06.02 SHORTNESS OF BREATH: ICD-10-CM

## 2024-11-27 DIAGNOSIS — E88.810 METABOLIC SYNDROME: ICD-10-CM

## 2024-11-27 PROCEDURE — 99214 OFFICE O/P EST MOD 30 MIN: CPT | Performed by: NURSE PRACTITIONER

## 2024-11-27 PROCEDURE — 3079F DIAST BP 80-89 MM HG: CPT | Performed by: NURSE PRACTITIONER

## 2024-11-27 PROCEDURE — 3074F SYST BP LT 130 MM HG: CPT | Performed by: NURSE PRACTITIONER

## 2024-11-27 RX ORDER — LOSARTAN POTASSIUM 25 MG/1
25 TABLET ORAL DAILY
COMMUNITY

## 2024-11-27 NOTE — PROGRESS NOTES
Chief Complaint   Patient presents with    Follow-up     Cardiac management    Lab     Last labs a month with Alison Monsalve in [place].    Palpitations     Has occasional yet not often.    Shortness of Breath     Notices occasionally with exertion, feels related to weight.    Cardiac clearance     Will need clearance for colonoscopy per Dr Mendoza on 12/5/24.    Med Refill     PCP and Alison Monsalve writes refills on medication.        HPI:  HPI   Any Meadows is a 59 y.o. female with a history of hypertension for which she has been on Tenormin for many years.  She establish care with us earlier in 2024 after being under a lot of stress for the previous few months.  Her brother was found dead.  It could have been drug overdose versus sudden cardiac arrest since he did have coronary calcification in the past.  She developed severe chest pain and left arm numbness.  She went to the emergency room where her initial blood pressure was more than 200 systolic.  She was given IV fluids and then low-dose of Cozaar after which the blood pressure got better.  MI was ruled out and she was sent home with and advised to follow-up with cardiologist.  She started having chest tightness on and off since then.  In the next few months she also lost mother, cousin and uncle.  She has been having increasing chest tightness both on exertion as well as at rest.  Every time she thinks about them, when she gets stressed and the chest tightness comes.  She also started noticing increasing shortness of breath during this time.  She also has palpitation in the form of heart skipping and racing.  Her lab work when she was in the emergency room did not show any acute changes.  She has no history of smoking or drinking alcohol.  She has the family history of sudden cardiac death.     She returns today for follow-up visit. Patient denies chest pain, pressure, palpitations, tightness, dizziness, shortness of air. She does report rare palpitations that  "are not any worse or different than previous. She has occasional SOB but attributes to weight gain. Stress and echo 9/2023 were stable with no ischemia. HTN BP response on stress. She is requesting cardiac clearance for a colonoscopy in a couple of weeks. Labs with GI last month normal CMP. No refills requested.     Cardiac History:    Past Surgical History:   Procedure Laterality Date    CARDIOVASCULAR STRESS TEST  09/21/2023    Lexiscan- EF 67%. 171/100. Negative    ECHO - CONVERTED  09/21/2023    TLS. EF 65%. Trace-Mild MR    OTHER SURGICAL HISTORY  09/12/2023    Pulse O2- Borderline       Current Outpatient Medications   Medication Sig Dispense Refill    atenolol (TENORMIN) 50 MG tablet Take 1 tablet by mouth 2 (Two) Times a Day.      losartan (COZAAR) 25 MG tablet Take 1 tablet by mouth Daily.      pantoprazole (Protonix) 40 MG EC tablet Take 1 tablet by mouth Daily. 60 tablet 2    Plecanatide (Trulance) 3 MG tablet Take 1 tablet by mouth Daily. 30 tablet 4    polyethylene glycol (MIRALAX) 17 GM/SCOOP powder Take 255 g Miralax mixed with 32 oz clear liquid at 8pm night before procedure then repeat 6 hours prior to arrival to hospital. 510 g 0     No current facility-administered medications for this visit.       Patient has no known allergies.    Past Medical History:   Diagnosis Date    Anxiety     Depression     Hx of tubal ligation     Hypertension     Liver cyst        Social History     Socioeconomic History    Marital status:    Tobacco Use    Smoking status: Never    Smokeless tobacco: Never   Vaping Use    Vaping status: Never Used   Substance and Sexual Activity    Alcohol use: No    Drug use: Never    Sexual activity: Defer       Family History   Problem Relation Age of Onset    Hypertension Mother     Cancer Mother     Diabetes Mother     Cancer Father     Heart attack Brother        Vitals:   /84 (BP Location: Left arm, Patient Position: Sitting)   Pulse 60   Ht 161.3 cm (63.5\")   Wt " 79.5 kg (175 lb 3.2 oz)   BMI 30.54 kg/m²     Physical Exam  Vitals and nursing note reviewed.   Neck:      Vascular: No carotid bruit.   Cardiovascular:      Rate and Rhythm: Normal rate and regular rhythm.      Pulses: Normal pulses.      Heart sounds: Normal heart sounds. No murmur heard.     No friction rub. No gallop.   Pulmonary:      Effort: Pulmonary effort is normal.      Breath sounds: Normal breath sounds and air entry.   Musculoskeletal:      Right lower leg: No edema.      Left lower leg: No edema.   Skin:     General: Skin is warm and dry.      Capillary Refill: Capillary refill takes less than 2 seconds.   Neurological:      Mental Status: She is alert and oriented to person, place, and time.       Procedures     Assessment & Plan     Diagnoses and all orders for this visit:    1. Essential hypertension (Primary)    2. Metabolic syndrome    3. Shortness of breath    4. Chest tightness    Hypertension  - BP controlled  - Continue atenolol, losartan    Metabolic syndrome  - Recommend eliminating sugar and refined carbohydrates and strictly limiting carbohydrates    Shortness of breath/chest tightness  - Controlled and asymptomatic  - Continue to medically manage    Stable from a cardiac standpoint. No further testing recommended at this time. No medication changes made today. No refills needed.       Visit Diagnoses:    ICD-10-CM ICD-9-CM   1. Essential hypertension  I10 401.9   2. Metabolic syndrome  E88.810 277.7   3. Shortness of breath  R06.02 786.05   4. Chest tightness  R07.89 786.59       Meds Ordered During Visit:  No orders of the defined types were placed in this encounter.      Follow Up Appointment:   Return in about 6 months (around 5/27/2025), or if symptoms worsen or fail to improve.           This document has been electronically signed by ARCHIE Cheng  December 2, 2024 17:10 EST    Dictated Utilizing Dragon Dictation: Part of this note may be an electronic  transcription/translation of spoken language to printed text using the Dragon Dictation System.

## 2024-11-27 NOTE — LETTER
December 2, 2024     ARCHIE Phipps  [address] [place] [address]    Patient: Any Meadows   YOB: 1965   Date of Visit: 11/27/2024     Dear ARCHIE Phipps:       Thank you for referring Any Meadows to me for evaluation. Below are the relevant portions of my assessment and plan of care.    If you have questions, please do not hesitate to call me. I look forward to following Any along with you.         Sincerely,        ARCHIE Cheng        CC: No Recipients    Lexis Oglesby APRN  12/02/24 1710  Sign when Signing Visit  Chief Complaint   Patient presents with   • Follow-up     Cardiac management   • Lab     Last labs a month with Alison Monsalve in [place].   • Palpitations     Has occasional yet not often.   • Shortness of Breath     Notices occasionally with exertion, feels related to weight.   • Cardiac clearance     Will need clearance for colonoscopy per Dr Mendoza on 12/5/24.   • Med Refill     PCP and Alison Monsalve writes refills on medication.        HPI:  HPI   Any Meadows is a 59 y.o. female with a history of hypertension for which she has been on Tenormin for many years.  She establish care with us earlier in 2024 after being under a lot of stress for the previous few months.  Her brother was found dead.  It could have been drug overdose versus sudden cardiac arrest since he did have coronary calcification in the past.  She developed severe chest pain and left arm numbness.  She went to the emergency room where her initial blood pressure was more than 200 systolic.  She was given IV fluids and then low-dose of Cozaar after which the blood pressure got better.  MI was ruled out and she was sent home with and advised to follow-up with cardiologist.  She started having chest tightness on and off since then.  In the next few months she also lost mother, cousin and uncle.  She has been having increasing chest tightness both on exertion as well as at rest.   Every time she thinks about them, when she gets stressed and the chest tightness comes.  She also started noticing increasing shortness of breath during this time.  She also has palpitation in the form of heart skipping and racing.  Her lab work when she was in the emergency room did not show any acute changes.  She has no history of smoking or drinking alcohol.  She has the family history of sudden cardiac death.     She returns today for follow-up visit. Patient denies chest pain, pressure, palpitations, tightness, dizziness, shortness of air. She does report rare palpitations that are not any worse or different than previous. She has occasional SOB but attributes to weight gain. Stress and echo 9/2023 were stable with no ischemia. HTN BP response on stress. She is requesting cardiac clearance for a colonoscopy in a couple of weeks. Labs with GI last month normal CMP. No refills requested.     Cardiac History:    Past Surgical History:   Procedure Laterality Date   • CARDIOVASCULAR STRESS TEST  09/21/2023    Lexiscan- EF 67%. 171/100. Negative   • ECHO - CONVERTED  09/21/2023    TLS. EF 65%. Trace-Mild MR   • OTHER SURGICAL HISTORY  09/12/2023    Pulse O2- Borderline       Current Outpatient Medications   Medication Sig Dispense Refill   • atenolol (TENORMIN) 50 MG tablet Take 1 tablet by mouth 2 (Two) Times a Day.     • losartan (COZAAR) 25 MG tablet Take 1 tablet by mouth Daily.     • pantoprazole (Protonix) 40 MG EC tablet Take 1 tablet by mouth Daily. 60 tablet 2   • Plecanatide (Trulance) 3 MG tablet Take 1 tablet by mouth Daily. 30 tablet 4   • polyethylene glycol (MIRALAX) 17 GM/SCOOP powder Take 255 g Miralax mixed with 32 oz clear liquid at 8pm night before procedure then repeat 6 hours prior to arrival to hospital. 510 g 0     No current facility-administered medications for this visit.       Patient has no known allergies.    Past Medical History:   Diagnosis Date   • Anxiety    • Depression    • Hx of  "tubal ligation    • Hypertension    • Liver cyst        Social History     Socioeconomic History   • Marital status:    Tobacco Use   • Smoking status: Never   • Smokeless tobacco: Never   Vaping Use   • Vaping status: Never Used   Substance and Sexual Activity   • Alcohol use: No   • Drug use: Never   • Sexual activity: Defer       Family History   Problem Relation Age of Onset   • Hypertension Mother    • Cancer Mother    • Diabetes Mother    • Cancer Father    • Heart attack Brother        Vitals:   /84 (BP Location: Left arm, Patient Position: Sitting)   Pulse 60   Ht 161.3 cm (63.5\")   Wt 79.5 kg (175 lb 3.2 oz)   BMI 30.54 kg/m²     Physical Exam  Vitals and nursing note reviewed.   Neck:      Vascular: No carotid bruit.   Cardiovascular:      Rate and Rhythm: Normal rate and regular rhythm.      Pulses: Normal pulses.      Heart sounds: Normal heart sounds. No murmur heard.     No friction rub. No gallop.   Pulmonary:      Effort: Pulmonary effort is normal.      Breath sounds: Normal breath sounds and air entry.   Musculoskeletal:      Right lower leg: No edema.      Left lower leg: No edema.   Skin:     General: Skin is warm and dry.      Capillary Refill: Capillary refill takes less than 2 seconds.   Neurological:      Mental Status: She is alert and oriented to person, place, and time.       Procedures     Assessment & Plan    Diagnoses and all orders for this visit:    1. Essential hypertension (Primary)    2. Metabolic syndrome    3. Shortness of breath    4. Chest tightness    Hypertension  - BP controlled  - Continue atenolol, losartan    Metabolic syndrome  - Recommend eliminating sugar and refined carbohydrates and strictly limiting carbohydrates    Shortness of breath/chest tightness  - Controlled and asymptomatic  - Continue to medically manage    Stable from a cardiac standpoint. No further testing recommended at this time. No medication changes made today. No refills needed.   "     Visit Diagnoses:    ICD-10-CM ICD-9-CM   1. Essential hypertension  I10 401.9   2. Metabolic syndrome  E88.810 277.7   3. Shortness of breath  R06.02 786.05   4. Chest tightness  R07.89 786.59       Meds Ordered During Visit:  No orders of the defined types were placed in this encounter.      Follow Up Appointment:   Return in about 6 months (around 5/27/2025), or if symptoms worsen or fail to improve.           This document has been electronically signed by ARCHIE Cheng  December 2, 2024 17:10 EST    Dictated Utilizing Dragon Dictation: Part of this note may be an electronic transcription/translation of spoken language to printed text using the Dragon Dictation System.

## 2024-12-02 PROBLEM — K62.5 BRBPR (BRIGHT RED BLOOD PER RECTUM): Status: ACTIVE | Noted: 2024-11-04

## 2024-12-02 PROBLEM — Z86.0100 PERSONAL HISTORY OF COLON POLYPS, UNSPECIFIED: Status: ACTIVE | Noted: 2024-11-04

## 2024-12-02 PROBLEM — Z80.0 FAMILY HISTORY OF COLON CANCER IN MOTHER: Status: ACTIVE | Noted: 2024-11-04

## 2024-12-18 ENCOUNTER — ANESTHESIA (OUTPATIENT)
Dept: PERIOP | Facility: HOSPITAL | Age: 59
End: 2024-12-18
Payer: COMMERCIAL

## 2024-12-18 ENCOUNTER — ANESTHESIA EVENT (OUTPATIENT)
Dept: PERIOP | Facility: HOSPITAL | Age: 59
End: 2024-12-18
Payer: COMMERCIAL

## 2024-12-18 ENCOUNTER — HOSPITAL ENCOUNTER (OUTPATIENT)
Facility: HOSPITAL | Age: 59
Setting detail: HOSPITAL OUTPATIENT SURGERY
Discharge: HOME | End: 2024-12-18
Attending: INTERNAL MEDICINE | Admitting: INTERNAL MEDICINE
Payer: COMMERCIAL

## 2024-12-18 VITALS
TEMPERATURE: 98 F | OXYGEN SATURATION: 95 % | HEART RATE: 67 BPM | DIASTOLIC BLOOD PRESSURE: 92 MMHG | BODY MASS INDEX: 29.02 KG/M2 | RESPIRATION RATE: 15 BRPM | HEIGHT: 64 IN | SYSTOLIC BLOOD PRESSURE: 117 MMHG | WEIGHT: 170 LBS

## 2024-12-18 DIAGNOSIS — K62.5 BRBPR (BRIGHT RED BLOOD PER RECTUM): ICD-10-CM

## 2024-12-18 DIAGNOSIS — Z86.0100 PERSONAL HISTORY OF COLON POLYPS, UNSPECIFIED: ICD-10-CM

## 2024-12-18 DIAGNOSIS — Z80.0 FAMILY HISTORY OF COLON CANCER IN MOTHER: ICD-10-CM

## 2024-12-18 PROCEDURE — 45385 COLONOSCOPY W/LESION REMOVAL: CPT | Performed by: INTERNAL MEDICINE

## 2024-12-18 PROCEDURE — 25010000002 MIDAZOLAM PER 1 MG: Performed by: NURSE ANESTHETIST, CERTIFIED REGISTERED

## 2024-12-18 PROCEDURE — 25010000002 PROPOFOL 200 MG/20ML EMULSION: Performed by: NURSE ANESTHETIST, CERTIFIED REGISTERED

## 2024-12-18 RX ORDER — MIDAZOLAM HYDROCHLORIDE 1 MG/ML
INJECTION, SOLUTION INTRAMUSCULAR; INTRAVENOUS AS NEEDED
Status: DISCONTINUED | OUTPATIENT
Start: 2024-12-18 | End: 2024-12-18 | Stop reason: SURG

## 2024-12-18 RX ORDER — FENTANYL CITRATE 50 UG/ML
50 INJECTION, SOLUTION INTRAMUSCULAR; INTRAVENOUS
Status: DISCONTINUED | OUTPATIENT
Start: 2024-12-18 | End: 2024-12-18 | Stop reason: HOSPADM

## 2024-12-18 RX ORDER — PROPOFOL 10 MG/ML
INJECTION, EMULSION INTRAVENOUS CONTINUOUS PRN
Status: DISCONTINUED | OUTPATIENT
Start: 2024-12-18 | End: 2024-12-18 | Stop reason: SURG

## 2024-12-18 RX ORDER — OXYCODONE AND ACETAMINOPHEN 5; 325 MG/1; MG/1
1 TABLET ORAL ONCE AS NEEDED
Status: DISCONTINUED | OUTPATIENT
Start: 2024-12-18 | End: 2024-12-18 | Stop reason: HOSPADM

## 2024-12-18 RX ORDER — IPRATROPIUM BROMIDE AND ALBUTEROL SULFATE 2.5; .5 MG/3ML; MG/3ML
3 SOLUTION RESPIRATORY (INHALATION) ONCE AS NEEDED
Status: DISCONTINUED | OUTPATIENT
Start: 2024-12-18 | End: 2024-12-18 | Stop reason: HOSPADM

## 2024-12-18 RX ORDER — ONDANSETRON 2 MG/ML
4 INJECTION INTRAMUSCULAR; INTRAVENOUS AS NEEDED
Status: DISCONTINUED | OUTPATIENT
Start: 2024-12-18 | End: 2024-12-18 | Stop reason: HOSPADM

## 2024-12-18 RX ORDER — MEPERIDINE HYDROCHLORIDE 25 MG/ML
12.5 INJECTION INTRAMUSCULAR; INTRAVENOUS; SUBCUTANEOUS
Status: DISCONTINUED | OUTPATIENT
Start: 2024-12-18 | End: 2024-12-18 | Stop reason: HOSPADM

## 2024-12-18 RX ADMIN — PROPOFOL 250 MCG/KG/MIN: 10 INJECTION, EMULSION INTRAVENOUS at 13:13

## 2024-12-18 RX ADMIN — MIDAZOLAM HYDROCHLORIDE 2 MG: 1 INJECTION, SOLUTION INTRAMUSCULAR; INTRAVENOUS at 13:11

## 2024-12-18 NOTE — H&P
[facility] HOSPITALIST HISTORY AND PHYSICAL    Patient Identification:  Name:  Any Meadows  Age:  59 y.o.  Sex:  female  :  1965  MRN:  [id number]   Visit Number:  [id number]  Primary Care Physician:  Marivel Yoo APRN       Chief complaint: Prior bleeding per rectum, family history of colon cancer.    History of presenting illness:  59 y.o. female presents today for colonoscopy due to BRBPR and family hx of colon cancer in her mother.  She is unsure age in which mother was diagnosed with colon cancer but states that she passed away from this.  Had a prior colonoscopy >15 years ago performed in [facility] that was notable for several polyps.  She never had a repeat.  In office she was having BRBPR and lower abdominal pressure.  Patient states that the BRBPR and abdominal pain have resolved since initiating Trulance.  Now she does endorse occasional bloating after eating.  She was having issues with constipation endorsing 2 bowel movements per week that she rated as BSS 1.  Now she notes having 1-2 bowel movements per day that are soft.  She endorses evacuation of her colon.  She has no other complaints today.      Review of Systems   Constitutional: Negative.    HENT: Negative.     Respiratory: Negative.     Cardiovascular: Negative.    Gastrointestinal:  Positive for abdominal pain, anal bleeding and constipation. Negative for abdominal distention, blood in stool, diarrhea, nausea, rectal pain and vomiting.   All other systems reviewed and are negative.       Past Medical History:   Diagnosis Date    Anxiety     Arthritis     Depression     GERD (gastroesophageal reflux disease)     Hx of tubal ligation     Hypertension     Liver cyst      Past Surgical History:   Procedure Laterality Date    CARDIOVASCULAR STRESS TEST  2023    Lexiscan- EF 67%. 171/100. Negative    ECHO - CONVERTED  2023    TLS. EF 65%. Trace-Mild MR    OTHER SURGICAL HISTORY  2023     Pulse O2- Borderline     Family History   Problem Relation Age of Onset    Hypertension Mother     Cancer Mother     Diabetes Mother     Cancer Father     Heart attack Brother      Social History     Socioeconomic History    Marital status:    Tobacco Use    Smoking status: Never    Smokeless tobacco: Never   Vaping Use    Vaping status: Never Used   Substance and Sexual Activity    Alcohol use: No    Drug use: Never    Sexual activity: Defer       Allergies:  Patient has no known allergies.    Prior to Admission Medications       Prescriptions Last Dose Informant Patient Reported? Taking?    atenolol (TENORMIN) 50 MG tablet   Yes No    Take 1 tablet by mouth 2 (Two) Times a Day.    Plecanatide (Trulance) 3 MG tablet   No No    Take 1 tablet by mouth Daily.    losartan (COZAAR) 25 MG tablet   Yes No    Take 1 tablet by mouth Daily.    pantoprazole (Protonix) 40 MG EC tablet   No No    Take 1 tablet by mouth Daily.    polyethylene glycol (MIRALAX) 17 GM/SCOOP powder   No No    Take 255 g Miralax mixed with 32 oz clear liquid at 8pm night before procedure then repeat 6 hours prior to arrival to hospital.          Hospital Scheduled Meds:    No current facility-administered medications for this encounter.      Vital Signs:     There were no vitals filed for this visit.  There is no height or weight on file to calculate BMI.    Physical Exam:  Constitutional:  Alert and oriented. Well developed and well nourished, in no acute distress.  HENT:  Head: Normocephalic and atraumatic.  Mouth:  Moist mucous membranes.  OP clear, mmm  Eyes:  Conjunctivae and EOM are normal.  Pupils are equal, round, and reactive to light.  No scleral icterus.  Neck:  Neck supple.  No JVD present.    Cardiovascular:  RRR, no MRG.  Pulmonary/Chest:  CTAB, unlabored.   Abdominal:  Soft.  Bowel sounds are normal.  No distension and no tenderness.    Psychiatric:  Normal mood and affect.  Behavior is normal.  Judgment and thought content  "normal.                     Invalid input(s): \"PROT\"CrCl cannot be calculated (Patient's most recent lab result is older than the maximum 30 days allowed.).  No results found for: \"AMMONIA\"          No results found for: \"HGBA1C\"  No results found for: \"TSH\", \"FREET4\"  No results found for: \"PREGTESTUR\", \"PREGSERUM\", \"HCG\", \"HCGQUANT\"  Pain Management Panel           No data to display              No results found for: \"BLOODCX\"  No results found for: \"URINECX\"  No results found for: \"WOUNDCX\"  No results found for: \"STOOLCX\"        Imaging Results (Last 7 Days)       ** No results found for the last 168 hours. **              Assessment and Plan:    Proceed with colonoscopy due to bright red bleeding per rectum and family history of colon cancer in her mother.    Alison Monsalve PA-C  12/18/24  12:44 EST    "

## 2024-12-18 NOTE — ANESTHESIA PREPROCEDURE EVALUATION
Anesthesia Evaluation     Patient summary reviewed and Nursing notes reviewed   no history of anesthetic complications:   NPO Solid Status: > 8 hours  NPO Liquid Status: > 8 hours           Airway   Mallampati: II  TM distance: >3 FB  Neck ROM: full  Dental    (+) partials        Pulmonary     breath sounds clear to auscultation  (+) ,shortness of breath  Cardiovascular   Exercise tolerance: good (4-7 METS)    ECG reviewed  Rhythm: regular  Rate: normal    (+) hypertension      Neuro/Psych  (+) psychiatric history Anxiety  GI/Hepatic/Renal/Endo    (+) obesity, GERD, GI bleeding (brbpr) lower resolved, liver disease    Musculoskeletal     Abdominal   (+) obese    Abdomen: soft.   Substance History - negative use     OB/GYN negative ob/gyn ROS         Other   arthritis,     ROS/Med Hx Other: 9/21/23    ·  The study is technically difficult  ·  Left ventricular wall thickness is consistent with mild concentric hypertrophy.  ·  Left ventricular ejection fraction appears to be 61 - 65%.  ·  Left ventricular diastolic function is consistent with (grade Ia w/high LAP) impaired relaxation.  ·  No aortic valve regurgitation or stenosis is present.  ·  Trace to mild mitral valve regurgitation is present.                     Anesthesia Plan    ASA 2     general     intravenous induction     Anesthetic plan, risks, benefits, and alternatives have been provided, discussed and informed consent has been obtained with: patient.  Pre-procedure education provided  Use of blood products discussed with  Consented to blood products.    Plan discussed with CRNA.    CODE STATUS:

## 2024-12-18 NOTE — ANESTHESIA POSTPROCEDURE EVALUATION
Patient: Any Meadows    Procedure Summary       Date: 12/18/24 Room / Location:  [facility]  /  [facility]    Anesthesia Start: 1312 Anesthesia Stop: 1330    Procedure: COLONOSCOPY Diagnosis:       Family history of colon cancer in mother      Personal history of colon polyps, unspecified      BRBPR (bright red blood per rectum)      (Family history of colon cancer in mother [Z80.0])      (Personal history of colon polyps, unspecified [Z86.0100])      (BRBPR (bright red blood per rectum) [K62.5])    Surgeons: Jaelyn Mena MD Provider: Woody Villegas MD    Anesthesia Type: general ASA Status: 2            Anesthesia Type: general    Vitals  Vitals Value Taken Time   /92 12/18/24 1400   Temp 98 °F (36.7 °C) 12/18/24 1333   Pulse 67 12/18/24 1400   Resp 15 12/18/24 1400   SpO2 95 % 12/18/24 1400           Post Anesthesia Care and Evaluation    Patient location during evaluation: PACU  Patient participation: complete - patient participated  Level of consciousness: awake  Pain score: 0  Pain management: satisfactory to patient    Airway patency: patent  Anesthetic complications: No anesthetic complications  PONV Status: none  Cardiovascular status: hemodynamically stable  Respiratory status: nasal cannula  Hydration status: acceptable

## 2024-12-19 LAB — REF LAB TEST METHOD: NORMAL

## 2024-12-29 ENCOUNTER — HOSPITAL ENCOUNTER (OUTPATIENT)
Dept: MRI IMAGING | Facility: HOSPITAL | Age: 59
Discharge: HOME | End: 2024-12-29
Payer: COMMERCIAL

## 2024-12-29 DIAGNOSIS — K76.89 HEPATIC CYST: ICD-10-CM

## 2024-12-29 PROCEDURE — 82565 ASSAY OF CREATININE: CPT

## 2024-12-29 PROCEDURE — A9577 INJ MULTIHANCE: HCPCS

## 2024-12-29 PROCEDURE — 25510000002 GADOBENATE DIMEGLUMINE 529 MG/ML SOLUTION

## 2024-12-29 PROCEDURE — 74183 MRI ABD W/O CNTR FLWD CNTR: CPT

## 2024-12-29 RX ADMIN — GADOBENATE DIMEGLUMINE 15 ML: 529 INJECTION, SOLUTION INTRAVENOUS at 17:25

## 2024-12-29 NOTE — PROGRESS NOTES
At the time of your recent colonoscopy, a polyp was removed from the colon.  The polyp was a tubular adenoma.  Tubular adenomas are considered precancerous.  Because of this you will need repeat colonoscopy in 5 years for surveillance.  Thank you for allowing me to participate in your care.

## 2024-12-31 ENCOUNTER — OFFICE VISIT (OUTPATIENT)
Dept: GASTROENTEROLOGY | Facility: CLINIC | Age: 59
End: 2024-12-31
Payer: COMMERCIAL

## 2024-12-31 VITALS
DIASTOLIC BLOOD PRESSURE: 99 MMHG | BODY MASS INDEX: 29.98 KG/M2 | OXYGEN SATURATION: 97 % | SYSTOLIC BLOOD PRESSURE: 169 MMHG | HEART RATE: 55 BPM | WEIGHT: 175.6 LBS | HEIGHT: 64 IN

## 2024-12-31 DIAGNOSIS — K21.9 GASTROESOPHAGEAL REFLUX DISEASE, UNSPECIFIED WHETHER ESOPHAGITIS PRESENT: Primary | ICD-10-CM

## 2024-12-31 DIAGNOSIS — K76.89 HEPATIC CYST: ICD-10-CM

## 2024-12-31 DIAGNOSIS — K58.1 IRRITABLE BOWEL SYNDROME WITH CONSTIPATION: ICD-10-CM

## 2024-12-31 PROCEDURE — 3077F SYST BP >= 140 MM HG: CPT

## 2024-12-31 PROCEDURE — 1160F RVW MEDS BY RX/DR IN RCRD: CPT

## 2024-12-31 PROCEDURE — 1159F MED LIST DOCD IN RCRD: CPT

## 2024-12-31 PROCEDURE — 99214 OFFICE O/P EST MOD 30 MIN: CPT

## 2024-12-31 PROCEDURE — 3080F DIAST BP >= 90 MM HG: CPT

## 2024-12-31 RX ORDER — CALCIUM POLYCARBOPHIL 625 MG
625 TABLET ORAL DAILY
Qty: 90 TABLET | Refills: 3 | Status: SHIPPED | OUTPATIENT
Start: 2024-12-31

## 2024-12-31 RX ORDER — PLECANATIDE 3 MG/1
3 TABLET ORAL DAILY
Qty: 90 TABLET | Refills: 3 | Status: SHIPPED | OUTPATIENT
Start: 2024-12-31

## 2024-12-31 RX ORDER — PANTOPRAZOLE SODIUM 40 MG/1
40 TABLET, DELAYED RELEASE ORAL DAILY
Qty: 90 TABLET | Refills: 3 | Status: SHIPPED | OUTPATIENT
Start: 2024-12-31

## 2024-12-31 NOTE — PROGRESS NOTES
"Chief Complaint  Personal history of colon polyps, unspecified    Any Meadows is a 59 y.o. female who presents today to [facility] for Personal history of colon polyps, unspecified.    HPI:   59-year-old female who reports today for evaluation of liver cyst.  Of note, patient is poor historian.  She states that she has had imaging recently but is unclear if this was an ultrasound, CT or MRI.  We did contact patient's PCP office while she was in house.  Per PCP office, patient has not had any abdominal imaging since .  CT abdomen pelvis performed in May 2017 was notable for small benign cyst in the liver, the largest of the left lobe measuring 6.5 centimeters.  Patient states that she has lower abdominal pain that \"feels like a pressure.\"  She states this pain is worse with eating spicy foods and is better after bowel movements.  She reports having 2 bowel movements per week that she rates as BSS 1.  She does have to strain occasionally and endorses incomplete evacuation of her colon.  She reports 1 episode of bright red bleeding per rectum last week with straining.  Patient has not tried over-the-counter stool softeners.  She reports having prior colonoscopy >15 years ago performed in [facility] [place].  This colonoscopy was notable for several polyps.  She also notes that her mother was diagnosed with colon cancer.  It is unclear if this is the primary etiology of malignancy or if this was metastasis.  However, her mother  shortly after diagnosis.  She notes having frequent acid reflux flares.  Her diet consist of fatty, greasy, and quite a bit of spicy food.  She drinks a sixpack of 12 ounce cans of Coca-Cola every day.  Liver ultrasound performed on 2024 was notable for 7.2 cm simple hepatic cyst in the left lobe of liver.  Subsequently, MRI abdomen with liver mass protocol was ordered.  This was notable for 9.2 cm abscess in the left lower liver " unchanged compared to prior CT exam.  Did call and discussed with radiology who states that this is a benign finding and nonconcerning at this time.  Patient was initiated on Trulance and Protonix on last visit.  Colonoscopy was performed on 12/18/2024 by Dr. Mendoza.  Perianal and digital rectal exam was normal, 5 mm polyp in the cecum, scattered diverticula, internal hemorrhoids, terminal ileum was normal.  Pathology was notable for tubular adenoma.  Repeat colonoscopy recommended in 5 years for surveillance.            Interval History:    Today, patient presents for follow-up.  She reports that she is feeling better.  She is having regular bowel movements on Trulance.  States that she is having a bowel movement every day.  She occasionally has to strain but feels that she is evacuating much better.  She denies hematochezia or bright red bleeding per rectum.  She notes ongoing lower abdominal pressure but states that this is much better and can be alleviated with bowel movements.  She notes no acid reflux on Protonix 40 mg once daily.  She denies right upper quadrant pain, epigastric pain, unintentional weight loss, nausea, vomiting, jaundice, abdominal distention, melena, hematochezia, and dysphagia.    Previous History:   Past Medical History:   Diagnosis Date    Anxiety     Arthritis     Depression     GERD (gastroesophageal reflux disease)     Hx of tubal ligation     Hypertension     Liver cyst       Past Surgical History:   Procedure Laterality Date    CARDIOVASCULAR STRESS TEST  09/21/2023    Lexiscan- EF 67%. 171/100. Negative    COLONOSCOPY      COLONOSCOPY N/A 12/18/2024    Procedure: COLONOSCOPY;  Surgeon: Jaelyn Mnea MD;  Location: [facility];  Service: Gastroenterology;  Laterality: N/A;    ECHO - CONVERTED  09/21/2023    TLS. EF 65%. Trace-Mild MR    OTHER SURGICAL HISTORY  09/12/2023    Pulse O2- Borderline      Social History     Socioeconomic History    Marital status:   "  Tobacco Use    Smoking status: Never    Smokeless tobacco: Never   Vaping Use    Vaping status: Never Used   Substance and Sexual Activity    Alcohol use: No    Drug use: Never    Sexual activity: Defer        Current Medications:  Current Outpatient Medications   Medication Sig Dispense Refill    atenolol (TENORMIN) 50 MG tablet Take 1 tablet by mouth 2 (Two) Times a Day.      losartan (COZAAR) 25 MG tablet Take 1 tablet by mouth Daily.      pantoprazole (Protonix) 40 MG EC tablet Take 1 tablet by mouth Daily. 90 tablet 3    Plecanatide (Trulance) 3 MG tablet Take 1 tablet by mouth Daily. 90 tablet 3    polycarbophil (calcium polycarbophil) 625 MG tablet tablet Take 1 tablet by mouth Daily. 90 tablet 3     No current facility-administered medications for this visit.       Allergies:   No Known Allergies    Vitals:   /99   Pulse 55   Ht 161.3 cm (63.5\")   Wt 79.7 kg (175 lb 9.6 oz)   SpO2 97%   BMI 30.62 kg/m²   Estimated body mass index is 30.62 kg/m² as calculated from the following:    Height as of this encounter: 161.3 cm (63.5\").    Weight as of this encounter: 79.7 kg (175 lb 9.6 oz).    ROS:   Review of Systems   HENT: Negative.     Cardiovascular: Negative.    Gastrointestinal:  Positive for abdominal pain. Negative for abdominal distention, anal bleeding, blood in stool, constipation, diarrhea, nausea, rectal pain and vomiting.   All other systems reviewed and are negative.       Physical Exam:   Physical Exam  Vitals reviewed.   Constitutional:       General: She is not in acute distress.     Appearance: Normal appearance. She is well-groomed. She is obese. She is not toxic-appearing.   HENT:      Head: Normocephalic and atraumatic.      Mouth/Throat:      Mouth: Mucous membranes are moist.   Eyes:      Extraocular Movements: Extraocular movements intact.   Cardiovascular:      Rate and Rhythm: Normal rate and regular rhythm.      Heart sounds: Normal heart sounds. No murmur heard.  Pulmonary: "      Effort: Pulmonary effort is normal. No respiratory distress.      Breath sounds: Normal breath sounds. No stridor. No wheezing or rales.   Abdominal:      General: Bowel sounds are normal. There is no distension.      Palpations: Abdomen is soft. There is no mass.      Tenderness: There is no abdominal tenderness. There is no guarding or rebound.      Hernia: No hernia is present.   Skin:     General: Skin is warm.      Coloration: Skin is not jaundiced.      Findings: No rash.   Neurological:      Mental Status: She is alert and oriented to person, place, and time.   Psychiatric:         Mood and Affect: Mood and affect normal.         Speech: Speech normal.         Behavior: Behavior normal. Behavior is cooperative.         Thought Content: Thought content normal.          Lab Results:   Lab Results   Component Value Date    WBC 5.74 05/23/2017    HGB 13.5 05/23/2017    HCT 40.4 05/23/2017    MCV 92.7 05/23/2017    RDW 12.3 05/23/2017     05/23/2017    NEUTRORELPCT 65.5 05/23/2017    LYMPHORELPCT 21.8 05/23/2017    MONORELPCT 9.9 05/23/2017    EOSRELPCT 1.9 05/23/2017    BASORELPCT 0.9 05/23/2017    NEUTROABS 3.76 05/23/2017    LYMPHSABS 1.25 05/23/2017       Lab Results   Component Value Date     11/04/2024    K 4.7 11/04/2024    CO2 25.0 11/04/2024     11/04/2024    BUN 9 11/04/2024    CREATININE 0.86 11/04/2024    EGFRIFNONA 86 05/23/2017    GLUCOSE 100 (H) 11/04/2024    CALCIUM 9.5 11/04/2024    ALKPHOS 86 11/04/2024    AST 22 11/04/2024    ALT 26 11/04/2024    BILITOT 0.3 11/04/2024    ALBUMIN 4.3 11/04/2024    PROTEINTOT 7.0 11/04/2024       Pathology:        Endoscopy:        Imaging:   MRI Abdomen With & Without Contrast    Result Date: 12/30/2024  9.2 cm cyst in the left lobe of the liver unchanged compared to the prior CT examination.      This report was finalized on 12/30/2024 8:42 AM by Christiano Faith M.D..      US Liver    Result Date: 11/18/2024  7.2 cm simple hepatic  cyst in the left lobe of the liver.     This report was finalized on 11/18/2024 11:12 AM by Christiano Faith M.D..         Results review: During today's encounter, all relevant clinical data has been reviewed.      Assessment and Plan  1. Gastroesophageal reflux disease, unspecified whether esophagitis present (Primary)  Discussed management for GERD: encouraged weight loss, HOB elevation at night, avoidance of meals 2-3 hours before bedtime, avoid trigger foods (caffeine, alcohol, chocolate, acidic/spicy foods e.g oranges/tomatoes), and encouraged smoking cessation  Well-controlled on Protonix 40 mg.  Refill sent.  -     pantoprazole (Protonix) 40 MG EC tablet; Take 1 tablet by mouth Daily.  Dispense: 90 tablet; Refill: 3    2. Irritable bowel syndrome with constipation  Regular bowel movements on Trulance.  Refill sent.  -     Plecanatide (Trulance) 3 MG tablet; Take 1 tablet by mouth Daily.  Dispense: 90 tablet; Refill: 3  Will initiate FiberCon once daily in addition to Trulance given ongoing lower abdominal pressure and diverticula noted on colonoscopy.  -     polycarbophil (calcium polycarbophil) 625 MG tablet tablet; Take 1 tablet by mouth Daily.  Dispense: 90 tablet; Refill: 3    3. Hepatic cyst  Per radiology, benign and unchanged from prior CT in 2017.  Will plan to repeat CMP and abdominal ultrasound on next visit.    New Medications:   New Medications Ordered This Visit   Medications    pantoprazole (Protonix) 40 MG EC tablet     Sig: Take 1 tablet by mouth Daily.     Dispense:  90 tablet     Refill:  3    Plecanatide (Trulance) 3 MG tablet     Sig: Take 1 tablet by mouth Daily.     Dispense:  90 tablet     Refill:  3    polycarbophil (calcium polycarbophil) 625 MG tablet tablet     Sig: Take 1 tablet by mouth Daily.     Dispense:  90 tablet     Refill:  3       Discontinued Medications:   Medications Discontinued During This Encounter   Medication Reason    pantoprazole (Protonix) 40 MG EC tablet  Reorder    Plecanatide (Trulance) 3 MG tablet Reorder        Visit Diagnoses:    ICD-10-CM ICD-9-CM   1. Gastroesophageal reflux disease, unspecified whether esophagitis present  K21.9 530.81   2. Irritable bowel syndrome with constipation  K58.1 564.1   3. Hepatic cyst  K76.89 573.8            Follow Up:   Return in about 3 months (around 3/31/2025).    The patient was in agreement with the plan and all questions were answered to patient's satisfaction.        This document has been electronically signed by Alison Monsalve PA-C   December 31, 2024 10:56 EST    Dictated Utilizing Dragon Dictation: Part of this note may be an electronic transcription/translation of spoken language to printed text using the Dragon Dictation System.    CC:  No ref. provider found  Marivel Yoo, ARCHIE

## 2025-01-01 LAB — CREAT BLDA-MCNC: 1.1 MG/DL (ref 0.6–1.3)

## 2025-01-02 ENCOUNTER — TELEPHONE (OUTPATIENT)
Dept: CARDIOLOGY | Facility: CLINIC | Age: 60
End: 2025-01-02
Payer: COMMERCIAL

## 2025-01-02 NOTE — TELEPHONE ENCOUNTER
"  Caller: Any Meadows \"FLOR\"    Relationship: Self    Best call back number: [phone]    What is the best time to reach you: ANYTIME    Who are you requesting to speak with (clinical staff, provider,  specific staff member): CLINICAL    Do you know the name of the person who called: NOT SURE    What was the call regarding:MESSAGE ON CHART STATES PATIENTS APPOINT TIME NEEDS TO BE CHANGED. PLEASE REACH OUT TO PATIENT TO SCHEDULE.    Is it okay if the provider responds through [email or web address]: CALL         "

## 2025-03-28 ENCOUNTER — HOSPITAL ENCOUNTER (OUTPATIENT)
Dept: CT IMAGING | Facility: HOSPITAL | Age: 60
Discharge: HOME | End: 2025-03-28
Payer: COMMERCIAL

## 2025-03-28 ENCOUNTER — RESULTS FOLLOW-UP (OUTPATIENT)
Dept: GASTROENTEROLOGY | Facility: CLINIC | Age: 60
End: 2025-03-28

## 2025-03-28 ENCOUNTER — OFFICE VISIT (OUTPATIENT)
Dept: GASTROENTEROLOGY | Facility: CLINIC | Age: 60
End: 2025-03-28
Payer: COMMERCIAL

## 2025-03-28 ENCOUNTER — LAB (OUTPATIENT)
Dept: LAB | Facility: HOSPITAL | Age: 60
End: 2025-03-28
Payer: COMMERCIAL

## 2025-03-28 VITALS
SYSTOLIC BLOOD PRESSURE: 158 MMHG | HEIGHT: 64 IN | WEIGHT: 172.6 LBS | HEART RATE: 62 BPM | DIASTOLIC BLOOD PRESSURE: 100 MMHG | BODY MASS INDEX: 29.47 KG/M2

## 2025-03-28 DIAGNOSIS — R10.13 EPIGASTRIC PAIN: ICD-10-CM

## 2025-03-28 DIAGNOSIS — K76.89 HEPATIC CYST: ICD-10-CM

## 2025-03-28 DIAGNOSIS — K21.9 GASTROESOPHAGEAL REFLUX DISEASE, UNSPECIFIED WHETHER ESOPHAGITIS PRESENT: ICD-10-CM

## 2025-03-28 DIAGNOSIS — K76.89 HEPATIC CYST: Primary | ICD-10-CM

## 2025-03-28 DIAGNOSIS — K58.1 IRRITABLE BOWEL SYNDROME WITH CONSTIPATION: ICD-10-CM

## 2025-03-28 LAB
ALBUMIN SERPL-MCNC: 3.9 G/DL (ref 3.5–5.2)
ALBUMIN/GLOB SERPL: 1.3 G/DL
ALP SERPL-CCNC: 87 U/L (ref 39–117)
ALT SERPL W P-5'-P-CCNC: 19 U/L (ref 1–33)
AMMONIA BLD-SCNC: 15 UMOL/L (ref 11–51)
ANION GAP SERPL CALCULATED.3IONS-SCNC: 8.2 MMOL/L (ref 5–15)
AST SERPL-CCNC: 20 U/L (ref 1–32)
BASOPHILS # BLD AUTO: 0.02 10*3/MM3 (ref 0–0.2)
BASOPHILS NFR BLD AUTO: 0.4 % (ref 0–1.5)
BILIRUB SERPL-MCNC: 0.3 MG/DL (ref 0–1.2)
BUN SERPL-MCNC: 7 MG/DL (ref 8–23)
BUN/CREAT SERPL: 8 (ref 7–25)
CALCIUM SPEC-SCNC: 9.2 MG/DL (ref 8.6–10.5)
CHLORIDE SERPL-SCNC: 102 MMOL/L (ref 98–107)
CO2 SERPL-SCNC: 25.8 MMOL/L (ref 22–29)
CREAT SERPL-MCNC: 0.87 MG/DL (ref 0.57–1)
DEPRECATED RDW RBC AUTO: 40.4 FL (ref 37–54)
EGFRCR SERPLBLD CKD-EPI 2021: 76.4 ML/MIN/1.73
EOSINOPHIL # BLD AUTO: 0.18 10*3/MM3 (ref 0–0.4)
EOSINOPHIL NFR BLD AUTO: 4 % (ref 0.3–6.2)
ERYTHROCYTE [DISTWIDTH] IN BLOOD BY AUTOMATED COUNT: 12.3 % (ref 12.3–15.4)
GLOBULIN UR ELPH-MCNC: 3.1 GM/DL
GLUCOSE SERPL-MCNC: 89 MG/DL (ref 65–99)
HCT VFR BLD AUTO: 36.5 % (ref 34–46.6)
HGB BLD-MCNC: 12.4 G/DL (ref 12–15.9)
IMM GRANULOCYTES # BLD AUTO: 0.01 10*3/MM3 (ref 0–0.05)
IMM GRANULOCYTES NFR BLD AUTO: 0.2 % (ref 0–0.5)
LIPASE SERPL-CCNC: 47 U/L (ref 13–60)
LYMPHOCYTES # BLD AUTO: 1.01 10*3/MM3 (ref 0.7–3.1)
LYMPHOCYTES NFR BLD AUTO: 22.2 % (ref 19.6–45.3)
MCH RBC QN AUTO: 30.9 PG (ref 26.6–33)
MCHC RBC AUTO-ENTMCNC: 34 G/DL (ref 31.5–35.7)
MCV RBC AUTO: 91 FL (ref 79–97)
MONOCYTES # BLD AUTO: 0.38 10*3/MM3 (ref 0.1–0.9)
MONOCYTES NFR BLD AUTO: 8.4 % (ref 5–12)
NEUTROPHILS NFR BLD AUTO: 2.95 10*3/MM3 (ref 1.7–7)
NEUTROPHILS NFR BLD AUTO: 64.8 % (ref 42.7–76)
NRBC BLD AUTO-RTO: 0 /100 WBC (ref 0–0.2)
PLATELET # BLD AUTO: 336 10*3/MM3 (ref 140–450)
PMV BLD AUTO: 10.7 FL (ref 6–12)
POTASSIUM SERPL-SCNC: 4.6 MMOL/L (ref 3.5–5.2)
PROT SERPL-MCNC: 7 G/DL (ref 6–8.5)
RBC # BLD AUTO: 4.01 10*6/MM3 (ref 3.77–5.28)
SODIUM SERPL-SCNC: 136 MMOL/L (ref 136–145)
WBC NRBC COR # BLD AUTO: 4.55 10*3/MM3 (ref 3.4–10.8)

## 2025-03-28 PROCEDURE — 85025 COMPLETE CBC W/AUTO DIFF WBC: CPT

## 2025-03-28 PROCEDURE — 3077F SYST BP >= 140 MM HG: CPT

## 2025-03-28 PROCEDURE — 80053 COMPREHEN METABOLIC PANEL: CPT

## 2025-03-28 PROCEDURE — 83690 ASSAY OF LIPASE: CPT

## 2025-03-28 PROCEDURE — 3080F DIAST BP >= 90 MM HG: CPT

## 2025-03-28 PROCEDURE — 36415 COLL VENOUS BLD VENIPUNCTURE: CPT

## 2025-03-28 PROCEDURE — 1159F MED LIST DOCD IN RCRD: CPT

## 2025-03-28 PROCEDURE — 25510000001 IOPAMIDOL 61 % SOLUTION

## 2025-03-28 PROCEDURE — 1160F RVW MEDS BY RX/DR IN RCRD: CPT

## 2025-03-28 PROCEDURE — 74178 CT ABD&PLV WO CNTR FLWD CNTR: CPT

## 2025-03-28 PROCEDURE — 99214 OFFICE O/P EST MOD 30 MIN: CPT

## 2025-03-28 PROCEDURE — 82140 ASSAY OF AMMONIA: CPT

## 2025-03-28 RX ORDER — IOPAMIDOL 612 MG/ML
100 INJECTION, SOLUTION INTRAVASCULAR
Status: COMPLETED | OUTPATIENT
Start: 2025-03-28 | End: 2025-03-28

## 2025-03-28 RX ORDER — TENAPANOR HYDROCHLORIDE 53.2 MG/1
50 TABLET ORAL 2 TIMES DAILY
Qty: 60 TABLET | Refills: 5 | Status: SHIPPED | OUTPATIENT
Start: 2025-03-28

## 2025-03-28 RX ORDER — MAGNESIUM CARB/ALUMINUM HYDROX 105-160MG
296 TABLET,CHEWABLE ORAL TAKE AS DIRECTED
Qty: 592 ML | Refills: 0 | Status: SHIPPED | OUTPATIENT
Start: 2025-03-28

## 2025-03-28 RX ORDER — LACTULOSE 10 G/15ML
SOLUTION ORAL; RECTAL
COMMUNITY
Start: 2025-03-04 | End: 2025-03-28

## 2025-03-28 RX ORDER — PANTOPRAZOLE SODIUM 40 MG/1
40 TABLET, DELAYED RELEASE ORAL 2 TIMES DAILY
Qty: 60 TABLET | Refills: 2 | Status: SHIPPED | OUTPATIENT
Start: 2025-03-28

## 2025-03-28 RX ADMIN — IOPAMIDOL 85 ML: 612 INJECTION, SOLUTION INTRAVENOUS at 11:37

## 2025-03-28 NOTE — PROGRESS NOTES
"Chief Complaint  GERD    Any Meadows is a 59 y.o. female who presents today to [facility] for GERD.    HPI:   59-year-old female who reports today for evaluation of liver cyst.  Of note, patient is poor historian.  She states that she has had imaging recently but is unclear if this was an ultrasound, CT or MRI.  We did contact patient's PCP office while she was in house.  Per PCP office, patient has not had any abdominal imaging since .  CT abdomen pelvis performed in May 2017 was notable for small benign cyst in the liver, the largest of the left lobe measuring 6.5 centimeters.  Patient states that she has lower abdominal pain that \"feels like a pressure.\"  She states this pain is worse with eating spicy foods and is better after bowel movements.  She reports having 2 bowel movements per week that she rates as BSS 1.  She does have to strain occasionally and endorses incomplete evacuation of her colon.  She reports 1 episode of bright red bleeding per rectum last week with straining.  Patient has not tried over-the-counter stool softeners.  She reports having prior colonoscopy >15 years ago performed in [facility].  This colonoscopy was notable for several polyps.  She also notes that her mother was diagnosed with colon cancer.  It is unclear if this is the primary etiology of malignancy or if this was metastasis.  However, her mother  shortly after diagnosis.  She notes having frequent acid reflux flares.  Her diet consist of fatty, greasy, and quite a bit of spicy food.  She drinks a sixpack of 12 ounce cans of Coca-Cola every day.  Liver ultrasound performed on 2024 was notable for 7.2 cm simple hepatic cyst in the left lobe of liver.  Subsequently, MRI abdomen with liver mass protocol was ordered.  This was notable for 9.2 cm abscess in the left lower liver unchanged compared to prior CT exam.  Did call and discussed with radiology who states " "that this is a benign finding and nonconcerning at this time.  Patient was initiated on Trulance and Protonix on last visit.  Colonoscopy was performed on 12/18/2024 by Dr. Mendoza.  Perianal and digital rectal exam was normal, 5 mm polyp in the cecum, scattered diverticula, internal hemorrhoids, terminal ileum was normal.  Pathology was notable for tubular adenoma.  Repeat colonoscopy recommended in 5 years for surveillance.     12/31/2024:  patient presents for follow-up.  She reports that she is feeling better.  She is having regular bowel movements on Trulance.  States that she is having a bowel movement every day.  She occasionally has to strain but feels that she is evacuating much better.  She denies hematochezia or bright red bleeding per rectum.  She notes ongoing lower abdominal pressure but states that this is much better and can be alleviated with bowel movements.  She notes no acid reflux on Protonix 40 mg once daily.  She denies right upper quadrant pain, epigastric pain, unintentional weight loss, nausea, vomiting, jaundice, abdominal distention, melena, hematochezia, and dysphagia.  Patient reported regular bowel movements on Trulance.  She was started on FiberCon as well due to diverticula noted on colonoscopy.  GERD was well-controlled on Protonix 40 mg daily.           Interval History:    Today, patient presents for follow-up.  States that for the last 10 days she has had epigastric soreness that is worse with taking a deep breath.  She reports having a similar pain approximately 3 weeks ago and was diagnosed with H. pylori.  Patient states that she was never tested for H. pylori.  She was given an azithromycin antibiotic and has not been checked for clearance.  She reports having a KUB performed that was \"negative for a bowel obstruction but something is going on in my bowels.\"  Patient cannot specify further.  Records have been requested from PCP.  She states that a CT abdomen has been ordered " but not performed yet.  Of note, patient does seem to be a poor historian and slightly confused today.  She notes having GERD 2-3 times per week.  She is currently on Protonix 40 mg daily.  Reports that her PCP has recently started her on Zofran.  She takes Zofran as needed.  She reports having 2 nausea episodes in the last month requiring Zofran.  She reports feeling vomit, up into her throat but she does not vomit.  Patient notes that her bowels are no longer moving regularly on Trulance and FiberCon.  She reports that she is having 2 bowel movements per week that she rates as BSS 1.  She denies unintentional weight loss, vomiting, hematemesis, dysphagia, hematochezia, or melena.      Past Medical History:   Diagnosis Date    Anxiety     Arthritis     Depression     GERD (gastroesophageal reflux disease)     Hx of tubal ligation     Hypertension     Liver cyst       Past Surgical History:   Procedure Laterality Date    CARDIOVASCULAR STRESS TEST  09/21/2023    Lexiscan- EF 67%. 171/100. Negative    COLONOSCOPY      COLONOSCOPY N/A 12/18/2024    Procedure: COLONOSCOPY;  Surgeon: Jaelyn Mena MD;  Location: [facility];  Service: Gastroenterology;  Laterality: N/A;    ECHO - CONVERTED  09/21/2023    TLS. EF 65%. Trace-Mild MR    OTHER SURGICAL HISTORY  09/12/2023    Pulse O2- Borderline      Social History     Socioeconomic History    Marital status:    Tobacco Use    Smoking status: Never    Smokeless tobacco: Never   Vaping Use    Vaping status: Never Used   Substance and Sexual Activity    Alcohol use: No    Drug use: Never    Sexual activity: Defer        Current Medications:  Current Outpatient Medications   Medication Sig Dispense Refill    atenolol (TENORMIN) 50 MG tablet Take 1 tablet by mouth 2 (Two) Times a Day.      losartan (COZAAR) 25 MG tablet Take 1 tablet by mouth Daily.      pantoprazole (Protonix) 40 MG EC tablet Take 1 tablet by mouth 2 (Two) Times a Day. 60 tablet 2     "Tenapanor HCl (Ibsrela) 50 MG tablet Take 1 tablet by mouth 2 (Two) Times a Day. 60 tablet 5     No current facility-administered medications for this visit.       Allergies:   No Known Allergies    Vitals:   /100   Pulse 62   Ht 161.3 cm (63.5\")   Wt 78.3 kg (172 lb 9.6 oz)   BMI 30.10 kg/m²   Estimated body mass index is 30.1 kg/m² as calculated from the following:    Height as of this encounter: 161.3 cm (63.5\").    Weight as of this encounter: 78.3 kg (172 lb 9.6 oz).    ROS:   Review of Systems   Constitutional: Negative.    HENT: Negative.     Respiratory: Negative.     Cardiovascular: Negative.    Gastrointestinal:  Positive for abdominal pain, constipation and nausea. Negative for abdominal distention, anal bleeding, blood in stool, diarrhea, rectal pain and vomiting.   All other systems reviewed and are negative.       Physical Exam:   Physical Exam  Vitals reviewed.   Constitutional:       General: She is not in acute distress.     Appearance: Normal appearance. She is well-groomed. She is obese. She is not toxic-appearing.   HENT:      Head: Normocephalic and atraumatic.      Mouth/Throat:      Mouth: Mucous membranes are moist.   Eyes:      Extraocular Movements: Extraocular movements intact.   Cardiovascular:      Rate and Rhythm: Normal rate and regular rhythm.      Heart sounds: Normal heart sounds. No murmur heard.  Pulmonary:      Effort: Pulmonary effort is normal. No respiratory distress.      Breath sounds: Normal breath sounds. No stridor. No wheezing or rales.   Abdominal:      General: Bowel sounds are normal. There is no distension.      Palpations: Abdomen is soft. There is no mass.      Tenderness: There is no abdominal tenderness. There is no guarding or rebound.      Hernia: No hernia is present.   Skin:     General: Skin is warm.      Coloration: Skin is not jaundiced.      Findings: No rash.   Neurological:      Mental Status: She is alert and oriented to person, place, and " time.   Psychiatric:         Mood and Affect: Mood and affect normal.         Speech: Speech normal.         Behavior: Behavior is cooperative.          Lab Results:   Lab Results   Component Value Date    WBC 5.74 05/23/2017    HGB 13.5 05/23/2017    HCT 40.4 05/23/2017    MCV 92.7 05/23/2017    RDW 12.3 05/23/2017     05/23/2017    NEUTRORELPCT 65.5 05/23/2017    LYMPHORELPCT 21.8 05/23/2017    MONORELPCT 9.9 05/23/2017    EOSRELPCT 1.9 05/23/2017    BASORELPCT 0.9 05/23/2017    NEUTROABS 3.76 05/23/2017    LYMPHSABS 1.25 05/23/2017       Lab Results   Component Value Date     11/04/2024    K 4.7 11/04/2024    CO2 25.0 11/04/2024     11/04/2024    BUN 9 11/04/2024    CREATININE 1.10 12/29/2024    EGFRIFNONA 86 05/23/2017    GLUCOSE 100 (H) 11/04/2024    CALCIUM 9.5 11/04/2024    ALKPHOS 86 11/04/2024    AST 22 11/04/2024    ALT 26 11/04/2024    BILITOT 0.3 11/04/2024    ALBUMIN 4.3 11/04/2024    PROTEINTOT 7.0 11/04/2024       Pathology:        Endoscopy:        Imaging:   MRI Abdomen With & Without Contrast  Result Date: 12/30/2024  9.2 cm cyst in the left lobe of the liver unchanged compared to the prior CT examination.      This report was finalized on 12/30/2024 8:42 AM by Christiano Faith M.D..          Results review: During today's encounter, all relevant clinical data has been reviewed.      Assessment and Plan  Diagnoses and all orders for this visit:    1. Hepatic cyst (Primary)  -     Cancel: US Liver; Future  -     Comprehensive Metabolic Panel; Future  -     Ammonia  -     CT Abdomen Pelvis With & Without Contrast; Future    2. Epigastric pain  -     H. Pylori Antigen, Stool - Stool, Per Rectum  -     CT Abdomen Pelvis With & Without Contrast; Future  -     Lipase  -     CBC & Differential  -     Calprotectin, Fecal - Stool, Per Rectum; Future    3. Irritable bowel syndrome with constipation  -     Tenapanor HCl (Ibsrela) 50 MG tablet; Take 1 tablet by mouth 2 (Two) Times a Day.   Dispense: 60 tablet; Refill: 5    4. Gastroesophageal reflux disease, unspecified whether esophagitis present  -     pantoprazole (Protonix) 40 MG EC tablet; Take 1 tablet by mouth 2 (Two) Times a Day.  Dispense: 60 tablet; Refill: 2        New Medications:   New Medications Ordered This Visit   Medications    Tenapanor HCl (Ibsrela) 50 MG tablet     Sig: Take 1 tablet by mouth 2 (Two) Times a Day.     Dispense:  60 tablet     Refill:  5    pantoprazole (Protonix) 40 MG EC tablet     Sig: Take 1 tablet by mouth 2 (Two) Times a Day.     Dispense:  60 tablet     Refill:  2       Discontinued Medications:   Medications Discontinued During This Encounter   Medication Reason    Enulose 10 GM/15ML solution solution (encephalopathy) Patient Reported Not Taking    polycarbophil (calcium polycarbophil) 625 MG tablet tablet Not Efficacious    Plecanatide (Trulance) 3 MG tablet Not Efficacious    pantoprazole (Protonix) 40 MG EC tablet Reorder        Visit Diagnoses:    ICD-10-CM ICD-9-CM   1. Hepatic cyst  K76.89 573.8   2. Epigastric pain  R10.13 789.06   3. Irritable bowel syndrome with constipation  K58.1 564.1   4. Gastroesophageal reflux disease, unspecified whether esophagitis present  K21.9 530.81            Follow Up:   Return in about 4 weeks (around 4/25/2025).    The patient was in agreement with the plan and all questions were answered to patient's satisfaction.        This document has been electronically signed by Alison Mars PA-C   March 28, 2025 13:36 EDT    Dictated Utilizing Dragon Dictation: Part of this note may be an electronic transcription/translation of spoken language to printed text using the Dragon Dictation System.    CC:  No ref. provider found  Marivel Yoo APRN

## 2025-04-02 ENCOUNTER — LAB (OUTPATIENT)
Dept: LAB | Facility: HOSPITAL | Age: 60
End: 2025-04-02
Payer: COMMERCIAL

## 2025-04-02 DIAGNOSIS — R10.13 EPIGASTRIC PAIN: ICD-10-CM

## 2025-04-02 PROCEDURE — 83993 ASSAY FOR CALPROTECTIN FECAL: CPT

## 2025-04-02 PROCEDURE — 87338 HPYLORI STOOL AG IA: CPT

## 2025-04-03 LAB — H PYLORI AG STL QL IA: NEGATIVE

## 2025-04-09 LAB — CALPROTECTIN STL-MCNT: 27 UG/G (ref 0–120)

## 2025-05-02 ENCOUNTER — OFFICE VISIT (OUTPATIENT)
Dept: GASTROENTEROLOGY | Facility: CLINIC | Age: 60
End: 2025-05-02
Payer: COMMERCIAL

## 2025-05-02 VITALS
SYSTOLIC BLOOD PRESSURE: 163 MMHG | BODY MASS INDEX: 29.26 KG/M2 | HEART RATE: 62 BPM | HEIGHT: 64 IN | WEIGHT: 171.4 LBS | DIASTOLIC BLOOD PRESSURE: 101 MMHG

## 2025-05-02 DIAGNOSIS — K21.9 GASTROESOPHAGEAL REFLUX DISEASE, UNSPECIFIED WHETHER ESOPHAGITIS PRESENT: ICD-10-CM

## 2025-05-02 DIAGNOSIS — K76.89 HEPATIC CYST: Primary | ICD-10-CM

## 2025-05-02 DIAGNOSIS — K58.1 IRRITABLE BOWEL SYNDROME WITH CONSTIPATION: ICD-10-CM

## 2025-05-02 PROCEDURE — 3077F SYST BP >= 140 MM HG: CPT

## 2025-05-02 PROCEDURE — 3080F DIAST BP >= 90 MM HG: CPT

## 2025-05-02 PROCEDURE — 99214 OFFICE O/P EST MOD 30 MIN: CPT

## 2025-05-02 PROCEDURE — 1159F MED LIST DOCD IN RCRD: CPT

## 2025-05-02 PROCEDURE — 1160F RVW MEDS BY RX/DR IN RCRD: CPT

## 2025-05-02 NOTE — PROGRESS NOTES
"Chief Complaint  Hepatic cyst    Any Meadows is a 59 y.o. female who presents today to [facility] for Hepatic cyst.    HPI:   59-year-old female who reports today for evaluation of liver cyst.  Of note, patient is poor historian.  She states that she has had imaging recently but is unclear if this was an ultrasound, CT or MRI.  We did contact patient's PCP office while she was in house.  Per PCP office, patient has not had any abdominal imaging since .  CT abdomen pelvis performed in May 2017 was notable for small benign cyst in the liver, the largest of the left lobe measuring 6.5 centimeters.  Patient states that she has lower abdominal pain that \"feels like a pressure.\"  She states this pain is worse with eating spicy foods and is better after bowel movements.  She reports having 2 bowel movements per week that she rates as BSS 1.  She does have to strain occasionally and endorses incomplete evacuation of her colon.  She reports 1 episode of bright red bleeding per rectum last week with straining.  Patient has not tried over-the-counter stool softeners.  She reports having prior colonoscopy >15 years ago performed in [facility].  This colonoscopy was notable for several polyps.  She also notes that her mother was diagnosed with colon cancer.  It is unclear if this is the primary etiology of malignancy or if this was metastasis.  However, her mother  shortly after diagnosis.  She notes having frequent acid reflux flares.  Her diet consist of fatty, greasy, and quite a bit of spicy food.  She drinks a sixpack of 12 ounce cans of Coca[employer] every day.  Liver ultrasound performed on 2024 was notable for 7.2 cm simple hepatic cyst in the left lobe of liver.  Subsequently, MRI abdomen with liver mass protocol was ordered.  This was notable for 9.2 cm abscess in the left lower liver unchanged compared to prior CT exam.  Did call and discussed with " "radiology who states that this is a benign finding and nonconcerning at this time.  Patient was initiated on Trulance and Protonix on last visit.  Colonoscopy was performed on 12/18/2024 by Dr. Mendoza.  Perianal and digital rectal exam was normal, 5 mm polyp in the cecum, scattered diverticula, internal hemorrhoids, terminal ileum was normal.  Pathology was notable for tubular adenoma.  Repeat colonoscopy recommended in 5 years for surveillance.     12/31/2024:  patient presents for follow-up.  She reports that she is feeling better.  She is having regular bowel movements on Trulance.  States that she is having a bowel movement every day.  She occasionally has to strain but feels that she is evacuating much better.  She denies hematochezia or bright red bleeding per rectum.  She notes ongoing lower abdominal pressure but states that this is much better and can be alleviated with bowel movements.  She notes no acid reflux on Protonix 40 mg once daily.  She denies right upper quadrant pain, epigastric pain, unintentional weight loss, nausea, vomiting, jaundice, abdominal distention, melena, hematochezia, and dysphagia.  Patient reported regular bowel movements on Trulance.  She was started on FiberCon as well due to diverticula noted on colonoscopy.  GERD was well-controlled on Protonix 40 mg daily.     3/28/2025:  patient presents for follow-up.  States that for the last 10 days she has had epigastric soreness that is worse with taking a deep breath.  She reports having a similar pain approximately 3 weeks ago and was diagnosed with H. pylori.  Patient states that she was never tested for H. pylori.  She was given an azithromycin antibiotic and has not been checked for clearance.  She reports having a KUB performed that was \"negative for a bowel obstruction but something is going on in my bowels.\"  Patient cannot specify further.  Records have been requested from PCP.  She states that a CT abdomen has been ordered " but not performed yet.  Of note, patient does seem to be a poor historian and slightly confused today.  She notes having GERD 2-3 times per week.  She is currently on Protonix 40 mg daily.  Reports that her PCP has recently started her on Zofran.  She takes Zofran as needed.  She reports having 2 nausea episodes in the last month requiring Zofran.  She reports feeling vomit, up into her throat but she does not vomit.  Patient notes that her bowels are no longer moving regularly on Trulance and FiberCon.  She reports that she is having 2 bowel movements per week that she rates as BSS 1.  She denies unintentional weight loss, vomiting, hematemesis, dysphagia, hematochezia, or melena.              Interval History:    Today, patient presents for followup. She notes having 1 bowel movement per day that she rates aas BSS 4-5.She has complete evacuation of her colon without excessive bleeding. She notes having lower abdominal pain that she describes as sharp/stabbing. This occurs mostly with eating spaghetti and pepperoni. Patient has not been taking Protonix 40mg as prescribed. She reports taking this approximately twice per week. No other complaints today.     Patient was hypertensive in office. She reported that she did not take her antihypertensives this morning.       Previous History:   Past Medical History:   Diagnosis Date    Anxiety     Arthritis     Depression     GERD (gastroesophageal reflux disease)     Hx of tubal ligation     Hypertension     Liver cyst       Past Surgical History:   Procedure Laterality Date    CARDIOVASCULAR STRESS TEST  09/21/2023    Lexiscan- EF 67%. 171/100. Negative    COLONOSCOPY      COLONOSCOPY N/A 12/18/2024    Procedure: COLONOSCOPY;  Surgeon: Jaelyn Mena MD;  Location: [facility];  Service: Gastroenterology;  Laterality: N/A;    ECHO - CONVERTED  09/21/2023    TLS. EF 65%. Trace-Mild MR    OTHER SURGICAL HISTORY  09/12/2023    Pulse O2- Borderline      Social  "History     Socioeconomic History    Marital status:    Tobacco Use    Smoking status: Never    Smokeless tobacco: Never   Vaping Use    Vaping status: Never Used   Substance and Sexual Activity    Alcohol use: No    Drug use: Never    Sexual activity: Defer        Current Medications:  Current Outpatient Medications   Medication Sig Dispense Refill    atenolol (TENORMIN) 50 MG tablet Take 1 tablet by mouth 2 (Two) Times a Day.      losartan (COZAAR) 25 MG tablet Take 1 tablet by mouth Daily.      magnesium citrate 1.745 GM/30ML solution solution Take 296 mL by mouth Take As Directed for 2 doses. Take one full bottle at 4:00 PM and take second bottle at 10:00 PM. 592 mL 0    pantoprazole (Protonix) 40 MG EC tablet Take 1 tablet by mouth 2 (Two) Times a Day. 60 tablet 2    Tenapanor HCl (Ibsrela) 50 MG tablet Take 1 tablet by mouth 2 (Two) Times a Day. 60 tablet 5     No current facility-administered medications for this visit.       Allergies:   No Known Allergies    Vitals:   BP (!) 163/101   Pulse 62   Ht 161.3 cm (63.5\")   Wt 77.7 kg (171 lb 6.4 oz)   BMI 29.89 kg/m²   Estimated body mass index is 29.89 kg/m² as calculated from the following:    Height as of this encounter: 161.3 cm (63.5\").    Weight as of this encounter: 77.7 kg (171 lb 6.4 oz).    ROS:   Review of Systems     Physical Exam:   Physical Exam     Lab Results:   Lab Results   Component Value Date    WBC 4.55 03/28/2025    HGB 12.4 03/28/2025    HCT 36.5 03/28/2025    MCV 91.0 03/28/2025    RDW 12.3 03/28/2025     03/28/2025    NEUTRORELPCT 64.8 03/28/2025    LYMPHORELPCT 22.2 03/28/2025    MONORELPCT 8.4 03/28/2025    EOSRELPCT 4.0 03/28/2025    BASORELPCT 0.4 03/28/2025    NEUTROABS 2.95 03/28/2025    LYMPHSABS 1.01 03/28/2025       Lab Results   Component Value Date     03/28/2025    K 4.6 03/28/2025    CO2 25.8 03/28/2025     03/28/2025    BUN 7 (L) 03/28/2025    CREATININE 0.87 03/28/2025    EGFRIFNONA 86 " 05/23/2017    GLUCOSE 89 03/28/2025    CALCIUM 9.2 03/28/2025    ALKPHOS 87 03/28/2025    AST 20 03/28/2025    ALT 19 03/28/2025    BILITOT 0.3 03/28/2025    ALBUMIN 3.9 03/28/2025    PROTEINTOT 7.0 03/28/2025       Pathology:        Endoscopy:        Imaging:       Results review: During today's encounter, all relevant clinical data has been reviewed.      Assessment and Plan    1. Hepatic cyst (Primary)  Large simple cyst noted on 3/28/25.   Will plan to repeat abdominal US on next visit.     2. Irritable bowel syndrome with constipation  Well controlled on IBSrela BID. Please continue     3. Gastroesophageal reflux disease, unspecified whether esophagitis present  Patient reports that this is improved  She has been noncompliant with protonix as prescribed and has been taking it prn.   Suspect abdominal pain s/t GERD giving that this occurs mostly with eating spicy foods and tomato based foods. Encouraged patient to take Protonix prior to eating these foods as they are known triggers. She verbalized understanding.     New Medications:   No orders of the defined types were placed in this encounter.      Discontinued Medications:   There are no discontinued medications.     Visit Diagnoses:    ICD-10-CM ICD-9-CM   1. Hepatic cyst  K76.89 573.8   2. Irritable bowel syndrome with constipation  K58.1 564.1   3. Gastroesophageal reflux disease, unspecified whether esophagitis present  K21.9 530.81            Follow Up:   Return in about 6 months (around 11/2/2025).    The patient was in agreement with the plan and all questions were answered to patient's satisfaction.        This document has been electronically signed by Alison Mars PA-C   May 7, 2025 09:35 EDT    Dictated Utilizing Dragon Dictation: Part of this note may be an electronic transcription/translation of spoken language to printed text using the Dragon Dictation System.    CC:  No ref. provider found  Marivel Yoo APRN

## (undated) DEVICE — CONN Y IRR DISP 1P/U

## (undated) DEVICE — TRAP,MUCUS SPECIMEN,40CC: Brand: MEDLINE

## (undated) DEVICE — Device: Brand: DEFENDO AIR/WATER/SUCTION AND BIOPSY VALVE

## (undated) DEVICE — Device

## (undated) DEVICE — SNAR POLYP CAPTIFLX MICRO OVL 13MM 240CM

## (undated) DEVICE — ENDOGATOR TUBING FOR ENDOGATOR EGP-100 IRRIGATION PUMP,OLYMPUS OFP PUMP, OLYMPUS AFU-100 PUMP AND ERBE EIP2 PUMP: Brand: ENDOGATOR

## (undated) DEVICE — ENDOGATOR AUXILIARY WATER JET CONNECTOR: Brand: ENDOGATOR